# Patient Record
Sex: MALE | Race: WHITE | Employment: UNEMPLOYED | URBAN - METROPOLITAN AREA
[De-identification: names, ages, dates, MRNs, and addresses within clinical notes are randomized per-mention and may not be internally consistent; named-entity substitution may affect disease eponyms.]

---

## 2017-07-21 RX ORDER — HYDROCHLOROTHIAZIDE 12.5 MG/1
TABLET ORAL
Qty: 90 TABLET | Refills: 2 | OUTPATIENT
Start: 2017-07-21

## 2017-08-07 ENCOUNTER — OFFICE VISIT (OUTPATIENT)
Dept: FAMILY MEDICINE CLINIC | Age: 22
End: 2017-08-07
Payer: COMMERCIAL

## 2017-08-07 VITALS
BODY MASS INDEX: 40.23 KG/M2 | HEIGHT: 70 IN | WEIGHT: 281 LBS | OXYGEN SATURATION: 98 % | TEMPERATURE: 98.3 F | HEART RATE: 73 BPM | SYSTOLIC BLOOD PRESSURE: 120 MMHG | DIASTOLIC BLOOD PRESSURE: 70 MMHG

## 2017-08-07 DIAGNOSIS — L70.0 ACNE VULGARIS: ICD-10-CM

## 2017-08-07 DIAGNOSIS — I10 ESSENTIAL HYPERTENSION: Primary | ICD-10-CM

## 2017-08-07 PROCEDURE — 99214 OFFICE O/P EST MOD 30 MIN: CPT | Performed by: NURSE PRACTITIONER

## 2017-08-07 RX ORDER — HYDROCHLOROTHIAZIDE 12.5 MG/1
12.5 TABLET ORAL DAILY
Qty: 90 TABLET | Refills: 2 | Status: SHIPPED | OUTPATIENT
Start: 2017-08-07 | End: 2018-01-09 | Stop reason: SDUPTHER

## 2017-08-07 ASSESSMENT — ENCOUNTER SYMPTOMS
ABDOMINAL PAIN: 0
CONSTIPATION: 0
ALLERGIC/IMMUNOLOGIC NEGATIVE: 1
COUGH: 0
EYES NEGATIVE: 1
WHEEZING: 0
RESPIRATORY NEGATIVE: 1
DIARRHEA: 0
ROS SKIN COMMENTS: ACNE
SHORTNESS OF BREATH: 0
GASTROINTESTINAL NEGATIVE: 1

## 2017-08-07 ASSESSMENT — PATIENT HEALTH QUESTIONNAIRE - PHQ9
2. FEELING DOWN, DEPRESSED OR HOPELESS: 0
1. LITTLE INTEREST OR PLEASURE IN DOING THINGS: 0
SUM OF ALL RESPONSES TO PHQ QUESTIONS 1-9: 0
SUM OF ALL RESPONSES TO PHQ9 QUESTIONS 1 & 2: 0

## 2018-01-06 ENCOUNTER — HOSPITAL ENCOUNTER (EMERGENCY)
Age: 23
Discharge: HOME | End: 2018-01-07
Attending: EMERGENCY MEDICINE
Payer: COMMERCIAL

## 2018-01-06 VITALS
BODY MASS INDEX: 40.89 KG/M2 | WEIGHT: 285 LBS | RESPIRATION RATE: 16 BRPM | OXYGEN SATURATION: 98 % | DIASTOLIC BLOOD PRESSURE: 95 MMHG | HEART RATE: 98 BPM | SYSTOLIC BLOOD PRESSURE: 140 MMHG | TEMPERATURE: 97.9 F

## 2018-01-06 DIAGNOSIS — K62.5 ANAL BLEEDING: Primary | ICD-10-CM

## 2018-01-06 PROCEDURE — 99283 EMERGENCY DEPT VISIT LOW MDM: CPT

## 2018-01-06 RX ORDER — AMOXICILLIN AND CLAVULANATE POTASSIUM 600; 42.9 MG/5ML; MG/5ML
875 POWDER, FOR SUSPENSION ORAL ONCE
Status: COMPLETED | OUTPATIENT
Start: 2018-01-07 | End: 2018-01-07

## 2018-01-06 RX ORDER — AMOXICILLIN AND CLAVULANATE POTASSIUM 600; 42.9 MG/5ML; MG/5ML
875 POWDER, FOR SUSPENSION ORAL 2 TIMES DAILY
Qty: 102.2 ML | Refills: 0 | Status: SHIPPED | OUTPATIENT
Start: 2018-01-06 | End: 2018-01-09 | Stop reason: ALTCHOICE

## 2018-01-07 PROCEDURE — 6370000000 HC RX 637 (ALT 250 FOR IP): Performed by: EMERGENCY MEDICINE

## 2018-01-07 RX ADMIN — AMOXICILLIN AND CLAVULANATE POTASSIUM 875 MG: 600; 42.9 SUSPENSION ORAL at 00:10

## 2018-01-07 ASSESSMENT — ENCOUNTER SYMPTOMS
NAUSEA: 0
ABDOMINAL PAIN: 0
ANAL BLEEDING: 1
VOMITING: 0
DIARRHEA: 0
SHORTNESS OF BREATH: 0
CONSTIPATION: 0

## 2018-01-07 NOTE — ED PROVIDER NOTES
appointment to see pain a few days. We'll discharge home with Augmentin. Mom advised to keep area clean and dry. RADIOLOGY:  None    EKG  None    All EKG's are interpreted by the Emergency Department Physician who either signs or Co-signs this chart in the absence of a cardiologist.    EMERGENCY DEPARTMENT COURSE:      PROCEDURES:  None    CONSULTS:  None    CRITICAL CARE:  None    FINAL IMPRESSION      1.  Anal bleeding          DISPOSITION / PLAN     DISPOSITION Decision To Discharge 01/06/2018 11:51:46 PM      PATIENT REFERRED TO:  Jazmyn Gramajo NP  [address] [address]  [phone]    Go on 1/9/2018  For wound re-check    [facility] ED  [address] [place] [address]  [phone]  Go to   If symptoms worsen      DISCHARGE MEDICATIONS:  New Prescriptions    AMOXICILLIN-CLAVULANATE (AUGMENTIN ES-600) 600-42.9 MG/5ML SUSPENSION    Take 7.3 mLs by mouth 2 times daily for 7 days       Omero Mike MD  Emergency Medicine Resident    (Please note that portions of this note were completed with a voice recognition program.  Efforts were made to edit the dictations but occasionally words are mis-transcribed.)        Omero Mike MD  Resident  01/07/18 [id number]

## 2018-01-09 ENCOUNTER — OFFICE VISIT (OUTPATIENT)
Dept: FAMILY MEDICINE CLINIC | Age: 23
End: 2018-01-09
Payer: COMMERCIAL

## 2018-01-09 ENCOUNTER — HOSPITAL ENCOUNTER (OUTPATIENT)
Age: 23
Setting detail: SPECIMEN
Discharge: HOME | End: 2018-01-09
Payer: COMMERCIAL

## 2018-01-09 VITALS
HEART RATE: 77 BPM | OXYGEN SATURATION: 99 % | TEMPERATURE: 98 F | DIASTOLIC BLOOD PRESSURE: 82 MMHG | WEIGHT: 283 LBS | SYSTOLIC BLOOD PRESSURE: 120 MMHG | BODY MASS INDEX: 40.61 KG/M2

## 2018-01-09 DIAGNOSIS — I10 ESSENTIAL HYPERTENSION: ICD-10-CM

## 2018-01-09 DIAGNOSIS — T14.8XXA OPEN WOUND: Primary | ICD-10-CM

## 2018-01-09 DIAGNOSIS — H61.21 IMPACTED CERUMEN OF RIGHT EAR: ICD-10-CM

## 2018-01-09 PROCEDURE — G8417 CALC BMI ABV UP PARAM F/U: HCPCS | Performed by: NURSE PRACTITIONER

## 2018-01-09 PROCEDURE — 99214 OFFICE O/P EST MOD 30 MIN: CPT | Performed by: NURSE PRACTITIONER

## 2018-01-09 PROCEDURE — G8484 FLU IMMUNIZE NO ADMIN: HCPCS | Performed by: NURSE PRACTITIONER

## 2018-01-09 PROCEDURE — G8427 DOCREV CUR MEDS BY ELIG CLIN: HCPCS | Performed by: NURSE PRACTITIONER

## 2018-01-09 PROCEDURE — 1036F TOBACCO NON-USER: CPT | Performed by: NURSE PRACTITIONER

## 2018-01-09 RX ORDER — HYDROCHLOROTHIAZIDE 12.5 MG/1
12.5 TABLET ORAL DAILY
Qty: 90 TABLET | Refills: 2 | Status: SHIPPED | OUTPATIENT
Start: 2018-01-09 | End: 2019-01-21 | Stop reason: SDUPTHER

## 2018-01-09 RX ORDER — MULTIVITAMIN WITH IRON
50 TABLET ORAL DAILY
COMMUNITY
End: 2021-08-26

## 2018-01-09 ASSESSMENT — ENCOUNTER SYMPTOMS
ALLERGIC/IMMUNOLOGIC NEGATIVE: 1
COUGH: 0
ABDOMINAL PAIN: 0
CONSTIPATION: 0
DIARRHEA: 0
EYES NEGATIVE: 1
SHORTNESS OF BREATH: 0
RESPIRATORY NEGATIVE: 1
GASTROINTESTINAL NEGATIVE: 1
WHEEZING: 0
ROS SKIN COMMENTS: ACNE

## 2018-01-09 NOTE — PROGRESS NOTES
peroxide (AURO EARDROPS) 6.5 % otic solution Place 5 drops in ear(s) 2 times daily 1 Bottle 0    levETIRAcetam (KEPPRA XR) 500 MG TB24 extended release tablet 500 mg 5 times daily   3    OXTELLAR  MG TB24 Take 600 mg by mouth 4 times daily   3    Melatonin 5 MG TABS tablet Take 1.5 mg by mouth daily Takes 1/2 of 5mg tab= 2.5mg daily       Pyridoxine HCl (VITAMIN B-6) 100 MG tablet Take 100 mg by mouth daily. No current facility-administered medications for this visit. Allergies   Allergen Reactions    Azithromycin     Bee Venom        Health Maintenance   Topic Date Due    Flu vaccine (1) 10/27/2018 (Originally 9/1/2017)    Potassium monitoring  01/09/2019 (Originally 12/27/2017)    Creatinine monitoring  01/09/2019 (Originally 12/27/2017)    HIV screen  01/09/2019 (Originally 4/8/2010)    DTaP/Tdap/Td vaccine (2 - Td) 10/27/2026    Meningococcal (MCV) Vaccine Age 0-22 Years  Completed          Review of Systems   Reason unable to perform ROS: per mom. Constitutional: Negative. Negative for diaphoresis, fatigue and fever. HENT: Negative. Eyes: Negative. Respiratory: Negative. Negative for cough, shortness of breath and wheezing. Cardiovascular: Negative. Negative for chest pain and palpitations. Gastrointestinal: Negative. Negative for abdominal pain, constipation and diarrhea. Endocrine: Negative. Negative for cold intolerance and heat intolerance. Genitourinary: Negative. Negative for difficulty urinating and urgency. Musculoskeletal: Negative. Negative for arthralgias. Skin: Positive for wound (gluteal cleft). Negative for pallor and rash. Acne     Allergic/Immunologic: Negative. Neurological: Positive for seizures. Negative for headaches. Hematological: Negative. Psychiatric/Behavioral: Positive for decreased concentration and dysphoric mood. Negative for sleep disturbance. The patient is not nervous/anxious.         Objective:     BP (HYDRODIURIL) 12.5 MG tablet; Take 1 tablet by mouth daily  Dispense: 90 tablet; Refill: 2    3. Impacted cerumen of right ear    - carbamide peroxide (AURO EARDROPS) 6.5 % otic solution; Place 5 drops in ear(s) 2 times daily  Dispense: 1 Bottle; Refill: 0  - Ear wax removal      Orders Placed This Encounter   Procedures    Aerobic Culture   [address]     Referral Priority:   Routine     Referral Type:   Consult for Advice and Opinion     Referral Reason:   Specialty Services Required     Number of Visits Requested:   1    Ear wax removal       No Follow-up on file. Patient given educational materials - see patient instructions. Discussed use, benefit, and side effects of prescribed medications. All patient questions answered. Pt voiced understanding. Reviewed health maintenance. Instructed to continue current medications, diet and exercise. Patient agreed with treatment plan. Follow up as directed.      Electronically signed by Derrick Florez NP on 1/9/2018

## 2018-01-09 NOTE — PROGRESS NOTES
Patient is present for a 6 month follow up for HTN. Patient was at [facility] er on 1/6/18 for an abscess in buttox region. Patient was given oral amoxil. Patient refuses to take medication. Mom wants to know if you can send something different in and in pill form that she can crysh to give him. Patient has seen Dr. Letty Ruby 1 month ago. Medications and pharmacy reviewed with patient. Patient is having right ear pain also. Visit Information    Have you changed or started any medications since your last visit including any over-the-counter medicines, vitamins, or herbal medicines? no   Have you stopped taking any of your medications? Is so, why? -  no  Are you having any side effects from any of your medications? - no    Have you seen any other physician or provider since your last visit? yes - Dr. Letty Ruby   Have you had any other diagnostic tests since your last visit?  no   Have you been seen in the emergency room and/or had an admission in a hospital since we last saw you?  yes - [facility], abscess    Have you had your routine dental cleaning in the past 6 months?  no     Do you have an active MyChart account? If no, what is the barrier?   Yes    Patient Care Team:  Nilsa Javier NP as PCP - General (Certified Nurse Practitioner)  Ever Sears MD as Consulting Physician (Neurology)  Nilsa Javier NP    Medical History Review  Past Medical, Family, and Social History reviewed and does contribute to the patient presenting condition    Health Maintenance   Topic Date Due    HIV screen  04/08/2010    Potassium monitoring  12/27/2017    Creatinine monitoring  12/27/2017    Flu vaccine (1) 10/27/2018 (Originally 9/1/2017)    DTaP/Tdap/Td vaccine (2 - Td) 10/27/2026    Meningococcal (MCV) Vaccine Age 0-22 Years  Completed

## 2018-01-16 LAB
CULTURE: ABNORMAL
CULTURE: ABNORMAL
DIRECT EXAM: ABNORMAL
Lab: ABNORMAL
SPECIMEN DESCRIPTION: ABNORMAL
STATUS: ABNORMAL

## 2018-02-02 ENCOUNTER — HOSPITAL ENCOUNTER (OUTPATIENT)
Dept: WOUND CARE | Age: 23
Discharge: HOME | End: 2018-02-02
Payer: COMMERCIAL

## 2018-02-16 ENCOUNTER — HOSPITAL ENCOUNTER (OUTPATIENT)
Dept: WOUND CARE | Age: 23
Discharge: HOME | End: 2018-02-16
Payer: COMMERCIAL

## 2018-02-16 VITALS
BODY MASS INDEX: 40.8 KG/M2 | WEIGHT: 285 LBS | TEMPERATURE: 98.4 F | RESPIRATION RATE: 18 BRPM | SYSTOLIC BLOOD PRESSURE: 149 MMHG | HEART RATE: 94 BPM | DIASTOLIC BLOOD PRESSURE: 89 MMHG | HEIGHT: 70 IN

## 2018-02-16 PROCEDURE — 99212 OFFICE O/P EST SF 10 MIN: CPT

## 2018-02-16 RX ORDER — LIDOCAINE HYDROCHLORIDE 40 MG/ML
SOLUTION TOPICAL ONCE
Status: DISCONTINUED | OUTPATIENT
Start: 2018-02-16 | End: 2018-02-17 | Stop reason: HOSPADM

## 2018-03-01 ENCOUNTER — HOSPITAL ENCOUNTER (OUTPATIENT)
Dept: WOUND CARE | Age: 23
Discharge: HOME | End: 2018-03-01
Payer: COMMERCIAL

## 2018-03-01 RX ORDER — LIDOCAINE HYDROCHLORIDE 40 MG/ML
SOLUTION TOPICAL ONCE
Status: DISCONTINUED | OUTPATIENT
Start: 2018-03-01 | End: 2018-03-04 | Stop reason: HOSPADM

## 2019-01-21 DIAGNOSIS — I10 ESSENTIAL HYPERTENSION: ICD-10-CM

## 2019-01-22 RX ORDER — HYDROCHLOROTHIAZIDE 12.5 MG/1
12.5 TABLET ORAL DAILY
Qty: 30 TABLET | Refills: 0 | Status: SHIPPED | OUTPATIENT
Start: 2019-01-22 | End: 2019-02-21 | Stop reason: SDUPTHER

## 2019-02-21 ENCOUNTER — OFFICE VISIT (OUTPATIENT)
Dept: FAMILY MEDICINE CLINIC | Age: 24
End: 2019-02-21
Payer: COMMERCIAL

## 2019-02-21 ENCOUNTER — HOSPITAL ENCOUNTER (OUTPATIENT)
Age: 24
Setting detail: SPECIMEN
Discharge: HOME | End: 2019-02-21
Payer: COMMERCIAL

## 2019-02-21 VITALS
DIASTOLIC BLOOD PRESSURE: 80 MMHG | HEIGHT: 70 IN | OXYGEN SATURATION: 97 % | BODY MASS INDEX: 39.65 KG/M2 | HEART RATE: 68 BPM | TEMPERATURE: 98.4 F | SYSTOLIC BLOOD PRESSURE: 122 MMHG | WEIGHT: 277 LBS

## 2019-02-21 DIAGNOSIS — Z13.220 SCREENING FOR HYPERLIPIDEMIA: ICD-10-CM

## 2019-02-21 DIAGNOSIS — L70.0 CYSTIC ACNE: ICD-10-CM

## 2019-02-21 DIAGNOSIS — I10 ESSENTIAL HYPERTENSION: Primary | ICD-10-CM

## 2019-02-21 DIAGNOSIS — I10 ESSENTIAL HYPERTENSION: ICD-10-CM

## 2019-02-21 LAB
ABSOLUTE EOS #: 0.21 K/UL (ref 0–0.44)
ABSOLUTE IMMATURE GRANULOCYTE: <0.03 K/UL (ref 0–0.3)
ABSOLUTE LYMPH #: 2.23 K/UL (ref 1.1–3.7)
ABSOLUTE MONO #: 0.8 K/UL (ref 0.1–1.2)
ALBUMIN SERPL-MCNC: 4.5 G/DL (ref 3.5–5.2)
ALBUMIN SERPL-MCNC: 4.5 G/DL (ref 3.5–5.2)
ALBUMIN/GLOBULIN RATIO: 1.6 (ref 1–2.5)
ALBUMIN/GLOBULIN RATIO: 1.6 (ref 1–2.5)
ALP BLD-CCNC: 80 U/L (ref 40–129)
ALP BLD-CCNC: 80 U/L (ref 40–129)
ALT SERPL-CCNC: 33 U/L (ref 5–41)
ALT SERPL-CCNC: 33 U/L (ref 5–41)
ANION GAP SERPL CALCULATED.3IONS-SCNC: 12 MMOL/L (ref 9–17)
ANION GAP SERPL CALCULATED.3IONS-SCNC: 12 MMOL/L (ref 9–17)
AST SERPL-CCNC: 28 U/L
AST SERPL-CCNC: 28 U/L
BASOPHILS # BLD: 1 % (ref 0–2)
BASOPHILS ABSOLUTE: 0.04 K/UL (ref 0–0.2)
BILIRUB SERPL-MCNC: <0.1 MG/DL (ref 0.3–1.2)
BILIRUB SERPL-MCNC: <0.1 MG/DL (ref 0.3–1.2)
BUN BLDV-MCNC: 20 MG/DL (ref 6–20)
BUN BLDV-MCNC: 20 MG/DL (ref 6–20)
BUN/CREAT BLD: ABNORMAL (ref 9–20)
BUN/CREAT BLD: ABNORMAL (ref 9–20)
CALCIUM SERPL-MCNC: 9.6 MG/DL (ref 8.6–10.4)
CALCIUM SERPL-MCNC: 9.6 MG/DL (ref 8.6–10.4)
CHLORIDE BLD-SCNC: 101 MMOL/L (ref 98–107)
CHLORIDE BLD-SCNC: 101 MMOL/L (ref 98–107)
CHOLESTEROL/HDL RATIO: 5.5
CHOLESTEROL: 202 MG/DL
CO2: 27 MMOL/L (ref 20–31)
CO2: 27 MMOL/L (ref 20–31)
CREAT SERPL-MCNC: 0.87 MG/DL (ref 0.7–1.2)
CREAT SERPL-MCNC: 0.87 MG/DL (ref 0.7–1.2)
DIFFERENTIAL TYPE: NORMAL
EOSINOPHILS RELATIVE PERCENT: 3 % (ref 1–4)
GFR AFRICAN AMERICAN: >60 ML/MIN
GFR AFRICAN AMERICAN: >60 ML/MIN
GFR NON-AFRICAN AMERICAN: >60 ML/MIN
GFR NON-AFRICAN AMERICAN: >60 ML/MIN
GFR SERPL CREATININE-BSD FRML MDRD: ABNORMAL ML/MIN/{1.73_M2}
GLUCOSE BLD-MCNC: 94 MG/DL (ref 70–99)
GLUCOSE BLD-MCNC: 94 MG/DL (ref 70–99)
HCT VFR BLD CALC: 46.4 % (ref 40.7–50.3)
HCT VFR BLD CALC: 46.4 % (ref 40.7–50.3)
HDLC SERPL-MCNC: 37 MG/DL
HEMOGLOBIN: 15.5 G/DL (ref 13–17)
HEMOGLOBIN: 15.5 G/DL (ref 13–17)
IMMATURE GRANULOCYTES: 0 %
KEPPRA: 19 UG/ML
LDL CHOLESTEROL: 146 MG/DL (ref 0–130)
LYMPHOCYTES # BLD: 31 % (ref 24–43)
MCH RBC QN AUTO: 29.6 PG (ref 25.2–33.5)
MCH RBC QN AUTO: 29.6 PG (ref 25.2–33.5)
MCHC RBC AUTO-ENTMCNC: 33.4 G/DL (ref 28.4–34.8)
MCHC RBC AUTO-ENTMCNC: 33.4 G/DL (ref 28.4–34.8)
MCV RBC AUTO: 88.5 FL (ref 82.6–102.9)
MCV RBC AUTO: 88.5 FL (ref 82.6–102.9)
MONOCYTES # BLD: 11 % (ref 3–12)
NRBC AUTOMATED: 0 PER 100 WBC
NRBC AUTOMATED: 0 PER 100 WBC
PDW BLD-RTO: 13.1 % (ref 11.8–14.4)
PDW BLD-RTO: 13.1 % (ref 11.8–14.4)
PLATELET # BLD: 244 K/UL (ref 138–453)
PLATELET # BLD: 244 K/UL (ref 138–453)
PLATELET ESTIMATE: NORMAL
PMV BLD AUTO: 11.8 FL (ref 8.1–13.5)
PMV BLD AUTO: 11.8 FL (ref 8.1–13.5)
POTASSIUM SERPL-SCNC: 3.9 MMOL/L (ref 3.7–5.3)
POTASSIUM SERPL-SCNC: 3.9 MMOL/L (ref 3.7–5.3)
RBC # BLD: 5.24 M/UL (ref 4.21–5.77)
RBC # BLD: 5.24 M/UL (ref 4.21–5.77)
RBC # BLD: NORMAL 10*6/UL
SEG NEUTROPHILS: 54 % (ref 36–65)
SEGMENTED NEUTROPHILS ABSOLUTE COUNT: 3.92 K/UL (ref 1.5–8.1)
SODIUM BLD-SCNC: 140 MMOL/L (ref 135–144)
SODIUM BLD-SCNC: 140 MMOL/L (ref 135–144)
TOTAL PROTEIN: 7.4 G/DL (ref 6.4–8.3)
TOTAL PROTEIN: 7.4 G/DL (ref 6.4–8.3)
TRIGL SERPL-MCNC: 97 MG/DL
VLDLC SERPL CALC-MCNC: ABNORMAL MG/DL (ref 1–30)
WBC # BLD: 7.2 K/UL (ref 3.5–11.3)
WBC # BLD: 7.2 K/UL (ref 3.5–11.3)
WBC # BLD: NORMAL 10*3/UL

## 2019-02-21 PROCEDURE — 1036F TOBACCO NON-USER: CPT | Performed by: NURSE PRACTITIONER

## 2019-02-21 PROCEDURE — 99213 OFFICE O/P EST LOW 20 MIN: CPT | Performed by: NURSE PRACTITIONER

## 2019-02-21 PROCEDURE — G8417 CALC BMI ABV UP PARAM F/U: HCPCS | Performed by: NURSE PRACTITIONER

## 2019-02-21 PROCEDURE — G8427 DOCREV CUR MEDS BY ELIG CLIN: HCPCS | Performed by: NURSE PRACTITIONER

## 2019-02-21 PROCEDURE — G8484 FLU IMMUNIZE NO ADMIN: HCPCS | Performed by: NURSE PRACTITIONER

## 2019-02-21 RX ORDER — DOXYCYCLINE HYCLATE 100 MG/1
100 CAPSULE ORAL 2 TIMES DAILY
Qty: 20 CAPSULE | Refills: 0 | Status: SHIPPED | OUTPATIENT
Start: 2019-02-21 | End: 2019-04-17 | Stop reason: SDUPTHER

## 2019-02-21 RX ORDER — ADAPALENE 0.1 G/100G
CREAM TOPICAL
Qty: 45 G | Refills: 11 | Status: SHIPPED | OUTPATIENT
Start: 2019-02-21 | End: 2020-01-16 | Stop reason: ALTCHOICE

## 2019-02-21 RX ORDER — HYDROCHLOROTHIAZIDE 12.5 MG/1
12.5 TABLET ORAL DAILY
Qty: 90 TABLET | Refills: 1 | Status: SHIPPED | OUTPATIENT
Start: 2019-02-21 | End: 2019-08-22 | Stop reason: SDUPTHER

## 2019-02-21 ASSESSMENT — PATIENT HEALTH QUESTIONNAIRE - PHQ9
1. LITTLE INTEREST OR PLEASURE IN DOING THINGS: 0
2. FEELING DOWN, DEPRESSED OR HOPELESS: 0
SUM OF ALL RESPONSES TO PHQ9 QUESTIONS 1 & 2: 0
SUM OF ALL RESPONSES TO PHQ QUESTIONS 1-9: 0
SUM OF ALL RESPONSES TO PHQ QUESTIONS 1-9: 0

## 2019-02-21 ASSESSMENT — ENCOUNTER SYMPTOMS
ABDOMINAL PAIN: 0
RESPIRATORY NEGATIVE: 1
CONSTIPATION: 0
GASTROINTESTINAL NEGATIVE: 1
ALLERGIC/IMMUNOLOGIC NEGATIVE: 1
ROS SKIN COMMENTS: ACNE
COUGH: 0
WHEEZING: 0
SHORTNESS OF BREATH: 0
EYES NEGATIVE: 1

## 2019-02-22 DIAGNOSIS — E78.2 MIXED HYPERLIPIDEMIA: Primary | ICD-10-CM

## 2019-02-24 DIAGNOSIS — E78.2 MIXED HYPERLIPIDEMIA: Primary | ICD-10-CM

## 2019-02-24 LAB — OXCARBAZEPINE: 43 UG/ML (ref 3–35)

## 2019-02-24 RX ORDER — ATORVASTATIN CALCIUM 20 MG/1
20 TABLET, FILM COATED ORAL DAILY
Qty: 30 TABLET | Refills: 11 | Status: SHIPPED | OUTPATIENT
Start: 2019-02-24 | End: 2020-01-16 | Stop reason: SDUPTHER

## 2019-03-25 DIAGNOSIS — L70.0 CYSTIC ACNE: ICD-10-CM

## 2019-03-25 RX ORDER — DOXYCYCLINE HYCLATE 100 MG/1
CAPSULE ORAL
Qty: 20 CAPSULE | Refills: 10 | OUTPATIENT
Start: 2019-03-25

## 2019-04-17 DIAGNOSIS — L70.0 CYSTIC ACNE: ICD-10-CM

## 2019-04-17 RX ORDER — DOXYCYCLINE HYCLATE 100 MG/1
CAPSULE ORAL
Qty: 20 CAPSULE | Refills: 10 | Status: SHIPPED | OUTPATIENT
Start: 2019-04-17 | End: 2021-01-27 | Stop reason: ALTCHOICE

## 2019-08-22 DIAGNOSIS — L70.0 CYSTIC ACNE: ICD-10-CM

## 2019-08-22 DIAGNOSIS — I10 ESSENTIAL HYPERTENSION: ICD-10-CM

## 2019-08-22 RX ORDER — HYDROCHLOROTHIAZIDE 12.5 MG/1
TABLET ORAL
Qty: 90 TABLET | Refills: 1 | Status: SHIPPED | OUTPATIENT
Start: 2019-08-22 | End: 2020-01-16 | Stop reason: SDUPTHER

## 2019-08-22 RX ORDER — ADAPALENE 0.1 G/100G
CREAM TOPICAL
Qty: 45 G | Refills: 11 | OUTPATIENT
Start: 2019-08-22

## 2019-10-18 DIAGNOSIS — L70.0 CYSTIC ACNE: ICD-10-CM

## 2019-11-14 DIAGNOSIS — L70.0 CYSTIC ACNE: ICD-10-CM

## 2020-01-16 ENCOUNTER — OFFICE VISIT (OUTPATIENT)
Dept: FAMILY MEDICINE CLINIC | Age: 25
End: 2020-01-16
Payer: COMMERCIAL

## 2020-01-16 ENCOUNTER — HOSPITAL ENCOUNTER (OUTPATIENT)
Age: 25
Setting detail: SPECIMEN
Discharge: HOME | End: 2020-01-16
Payer: COMMERCIAL

## 2020-01-16 VITALS
OXYGEN SATURATION: 98 % | BODY MASS INDEX: 39.8 KG/M2 | HEART RATE: 75 BPM | HEIGHT: 70 IN | DIASTOLIC BLOOD PRESSURE: 80 MMHG | WEIGHT: 278 LBS | SYSTOLIC BLOOD PRESSURE: 120 MMHG

## 2020-01-16 PROBLEM — R45.1 AGITATION: Status: ACTIVE | Noted: 2019-11-15

## 2020-01-16 PROBLEM — G40.309 EPILEPSY, GENERALIZED, CONVULSIVE (HCC): Status: ACTIVE | Noted: 2019-07-12

## 2020-01-16 LAB
ABSOLUTE EOS #: 0.25 K/UL (ref 0–0.44)
ABSOLUTE IMMATURE GRANULOCYTE: <0.03 K/UL (ref 0–0.3)
ABSOLUTE LYMPH #: 1.91 K/UL (ref 1.1–3.7)
ABSOLUTE MONO #: 0.6 K/UL (ref 0.1–1.2)
ALBUMIN SERPL-MCNC: 4.2 G/DL (ref 3.5–5.2)
ALBUMIN/GLOBULIN RATIO: 1.4 (ref 1–2.5)
ALP BLD-CCNC: 80 U/L (ref 40–129)
ALT SERPL-CCNC: 36 U/L (ref 5–41)
ANION GAP SERPL CALCULATED.3IONS-SCNC: 17 MMOL/L (ref 9–17)
AST SERPL-CCNC: 26 U/L
BASOPHILS # BLD: 0 % (ref 0–2)
BASOPHILS ABSOLUTE: 0.03 K/UL (ref 0–0.2)
BILIRUB SERPL-MCNC: 0.17 MG/DL (ref 0.3–1.2)
BUN BLDV-MCNC: 12 MG/DL (ref 6–20)
BUN/CREAT BLD: ABNORMAL (ref 9–20)
CALCIUM SERPL-MCNC: 8.9 MG/DL (ref 8.6–10.4)
CHLORIDE BLD-SCNC: 104 MMOL/L (ref 98–107)
CHOLESTEROL/HDL RATIO: 3.6
CHOLESTEROL: 137 MG/DL
CO2: 23 MMOL/L (ref 20–31)
CREAT SERPL-MCNC: 0.94 MG/DL (ref 0.7–1.2)
DIFFERENTIAL TYPE: NORMAL
EOSINOPHILS RELATIVE PERCENT: 3 % (ref 1–4)
GFR AFRICAN AMERICAN: >60 ML/MIN
GFR NON-AFRICAN AMERICAN: >60 ML/MIN
GFR SERPL CREATININE-BSD FRML MDRD: ABNORMAL ML/MIN/{1.73_M2}
GFR SERPL CREATININE-BSD FRML MDRD: ABNORMAL ML/MIN/{1.73_M2}
GLUCOSE BLD-MCNC: 103 MG/DL (ref 70–99)
HCT VFR BLD CALC: 44.6 % (ref 40.7–50.3)
HDLC SERPL-MCNC: 38 MG/DL
HEMOGLOBIN: 14.7 G/DL (ref 13–17)
IMMATURE GRANULOCYTES: 0 %
LDL CHOLESTEROL: 64 MG/DL (ref 0–130)
LYMPHOCYTES # BLD: 25 % (ref 24–43)
MCH RBC QN AUTO: 29.5 PG (ref 25.2–33.5)
MCHC RBC AUTO-ENTMCNC: 33 G/DL (ref 28.4–34.8)
MCV RBC AUTO: 89.6 FL (ref 82.6–102.9)
MONOCYTES # BLD: 8 % (ref 3–12)
NRBC AUTOMATED: 0 PER 100 WBC
PDW BLD-RTO: 13 % (ref 11.8–14.4)
PLATELET # BLD: 258 K/UL (ref 138–453)
PLATELET ESTIMATE: NORMAL
PMV BLD AUTO: 11.8 FL (ref 8.1–13.5)
POTASSIUM SERPL-SCNC: 3.6 MMOL/L (ref 3.7–5.3)
RBC # BLD: 4.98 M/UL (ref 4.21–5.77)
RBC # BLD: NORMAL 10*6/UL
SEG NEUTROPHILS: 64 % (ref 36–65)
SEGMENTED NEUTROPHILS ABSOLUTE COUNT: 4.8 K/UL (ref 1.5–8.1)
SODIUM BLD-SCNC: 144 MMOL/L (ref 135–144)
TOTAL PROTEIN: 7.1 G/DL (ref 6.4–8.3)
TRIGL SERPL-MCNC: 177 MG/DL
VLDLC SERPL CALC-MCNC: ABNORMAL MG/DL (ref 1–30)
WBC # BLD: 7.6 K/UL (ref 3.5–11.3)
WBC # BLD: NORMAL 10*3/UL

## 2020-01-16 PROCEDURE — G8484 FLU IMMUNIZE NO ADMIN: HCPCS | Performed by: NURSE PRACTITIONER

## 2020-01-16 PROCEDURE — 1036F TOBACCO NON-USER: CPT | Performed by: NURSE PRACTITIONER

## 2020-01-16 PROCEDURE — 99213 OFFICE O/P EST LOW 20 MIN: CPT | Performed by: NURSE PRACTITIONER

## 2020-01-16 PROCEDURE — G8417 CALC BMI ABV UP PARAM F/U: HCPCS | Performed by: NURSE PRACTITIONER

## 2020-01-16 PROCEDURE — G8427 DOCREV CUR MEDS BY ELIG CLIN: HCPCS | Performed by: NURSE PRACTITIONER

## 2020-01-16 RX ORDER — RISPERIDONE 1 MG/1
TABLET, FILM COATED ORAL
Qty: 60 TABLET | Status: CANCELLED | OUTPATIENT
Start: 2020-01-16

## 2020-01-16 RX ORDER — HYDROCHLOROTHIAZIDE 12.5 MG/1
TABLET ORAL
Qty: 90 TABLET | Refills: 1 | Status: SHIPPED | OUTPATIENT
Start: 2020-01-16 | End: 2020-04-14

## 2020-01-16 RX ORDER — RISPERIDONE 1 MG/1
TABLET, FILM COATED ORAL
COMMUNITY
Start: 2019-11-15

## 2020-01-16 RX ORDER — HYDROCHLOROTHIAZIDE 12.5 MG/1
TABLET ORAL
Qty: 90 TABLET | Refills: 1 | Status: SHIPPED | OUTPATIENT
Start: 2020-01-16 | End: 2020-01-16 | Stop reason: ALTCHOICE

## 2020-01-16 RX ORDER — ATORVASTATIN CALCIUM 20 MG/1
20 TABLET, FILM COATED ORAL DAILY
Qty: 90 TABLET | Refills: 1 | Status: SHIPPED | OUTPATIENT
Start: 2020-01-16 | End: 2020-10-05

## 2020-01-16 ASSESSMENT — ENCOUNTER SYMPTOMS
ALLERGIC/IMMUNOLOGIC NEGATIVE: 1
GASTROINTESTINAL NEGATIVE: 1
RESPIRATORY NEGATIVE: 1
EYES NEGATIVE: 1
COUGH: 0

## 2020-01-16 ASSESSMENT — PATIENT HEALTH QUESTIONNAIRE - PHQ9: DEPRESSION UNABLE TO ASSESS: PT REFUSES

## 2020-01-16 NOTE — PROGRESS NOTES
[facility]  [facility]YS [facility] [address]  [facility] [place] [address]  Dept: [phone]  Dept Fax: [phone]      Roe Moore is a 25 y.o. male who presents today for hismedical conditions/complaints as noted below. Roe Moore is c/o of 6 Month Follow-Up and Hypertension            HPI:      KAPIL Delgadillo is here today for BP check. BP is slightly elevated today. Denies chest pain or headaches. sz disorder-seeing Dr. Ervin Camilo. No seizures. Getting labs drawn today.        Hemoglobin A1C (%)   Date Value   08/22/2015 5.2             ( goal A1Cis < 7)   No results found for: LABMICR  LDL Cholesterol (mg/dL)   Date Value   02/21/2019 146 (H)       (goal LDL is <100)   AST (U/L)   Date Value   02/21/2019 28   02/21/2019 28     ALT (U/L)   Date Value   02/21/2019 33   02/21/2019 33     BUN (mg/dL)   Date Value   02/21/2019 20   02/21/2019 20     BP Readings from Last 3 Encounters:   01/16/20 (!) 138/90   02/21/19 122/80   02/16/18 (!) 149/89          (goal 120/80)    Past Medical History:   Diagnosis Date    Autism     Mental developmental delay 8/9/2012    Seizure disorder ([facility] [address] 8/9/2012    Seizures ([facility] [address]       Past Surgical History:   Procedure Laterality Date    TOE SURGERY         Family History   Problem Relation Age of Onset    High Blood Pressure Mother     Diabetes Mother     High Cholesterol Mother     High Blood Pressure Father     Heart Disease Maternal Grandmother     Heart Disease Maternal Grandfather           Social History     Tobacco Use    Smoking status: Never Smoker    Smokeless tobacco: Never Used   Substance Use Topics    Alcohol use: No         Current Outpatient Medications   Medication Sig Dispense Refill    risperiDONE (RISPERDAL) 1 MG tablet Take 1 tablet by mouth nightly      atorvastatin (LIPITOR) 20 MG tablet Take 1 tablet by mouth daily 90 tablet 1    hydrochlorothiazide (HYDRODIURIL) 12.5 MG tablet TAKE 1 TABLET BY MOUTH ONCE difficulty urinating and urgency. Musculoskeletal: Negative. Negative for arthralgias. Skin: Negative for wound. Allergic/Immunologic: Negative. Neurological: Positive for seizures. Negative for headaches. Hematological: Negative. Psychiatric/Behavioral: Positive for decreased concentration and dysphoric mood. Negative for sleep disturbance. The patient is not nervous/anxious. Objective:     BP (!) 138/90 (Site: Right Upper Arm, Position: Sitting, Cuff Size: Medium Adult)   Pulse 75   Ht 5' 10\" (1.778 m)   Wt 278 lb (126.1 kg)   SpO2 98%   BMI 39.89 kg/m²   Physical Exam      Assessment:      1. Mixed hyperlipidemia    2. Essential hypertension                     Plan:      No orders of the defined types were placed in this encounter. 1. Essential hypertension    - hydrochlorothiazide (HYDRODIURIL) 12.5 MG tablet; TAKE 1 TABLET BY MOUTH ONCE DAILY  Dispense: 90 tablet; Refill: 1    2. Mixed hyperlipidemia    - atorvastatin (LIPITOR) 20 MG tablet; Take 1 tablet by mouth daily  Dispense: 90 tablet; Refill: 1        No follow-ups on file. Patient given educational materials - see patientinstructions. Discussed use, benefit, and side effects of prescribed medications. All patient questions answered. Pt voiced understanding. Reviewed health maintenance. Instructed to continue current medications, diet and exercise. Patient agreedwith treatment plan. Follow up as directed.      Electronically signed by ROBERTO Bansal CNP on 1/16/2020

## 2020-01-20 LAB — OXCARBAZEPINE: 37 UG/ML (ref 3–35)

## 2020-04-14 RX ORDER — HYDROCHLOROTHIAZIDE 12.5 MG/1
TABLET ORAL
Qty: 90 TABLET | Refills: 10 | OUTPATIENT
Start: 2020-04-14

## 2020-04-14 RX ORDER — HYDROCHLOROTHIAZIDE 12.5 MG/1
TABLET ORAL
Qty: 90 TABLET | Refills: 1 | Status: SHIPPED | OUTPATIENT
Start: 2020-04-14 | End: 2020-10-06

## 2020-07-25 ENCOUNTER — PATIENT MESSAGE (OUTPATIENT)
Dept: FAMILY MEDICINE CLINIC | Age: 25
End: 2020-07-25

## 2020-07-27 RX ORDER — CLINDAMYCIN HYDROCHLORIDE 300 MG/1
300 CAPSULE ORAL 3 TIMES DAILY
Qty: 21 CAPSULE | Refills: 0 | Status: SHIPPED | OUTPATIENT
Start: 2020-07-27 | End: 2020-08-26 | Stop reason: SDUPTHER

## 2020-07-27 NOTE — TELEPHONE ENCOUNTER
From: Leopold Costa  To: ROBERTO Crowell - CNP  Sent: 7/25/2020 6:58 AM EDT  Subject: Prescription Question    This message is being sent by Дмитрий Goldberg on behalf of Leopold Costa. Edson Lau this is Jatinder Jeans Scott's mom I'm having a big problem he has a tooth that is broke he won't let the dentist pull it they said that he would have to be put to sleep in order to pull it. He's been on antibiotics once before it is very swollen and he's not eating and I don't know what to do this is serious and may not sound serious but it is it is very swollen there's definitely infection in there.  Please help me with nothing else in definitely needs antibiotics

## 2020-08-05 ENCOUNTER — OFFICE VISIT (OUTPATIENT)
Dept: FAMILY MEDICINE CLINIC | Age: 25
End: 2020-08-05
Payer: COMMERCIAL

## 2020-08-05 VITALS
DIASTOLIC BLOOD PRESSURE: 80 MMHG | BODY MASS INDEX: 38.28 KG/M2 | HEART RATE: 64 BPM | WEIGHT: 266.8 LBS | SYSTOLIC BLOOD PRESSURE: 118 MMHG | OXYGEN SATURATION: 98 % | TEMPERATURE: 97 F

## 2020-08-05 PROBLEM — E66.01 MORBIDLY OBESE (HCC): Status: ACTIVE | Noted: 2020-08-05

## 2020-08-05 PROCEDURE — 99213 OFFICE O/P EST LOW 20 MIN: CPT | Performed by: NURSE PRACTITIONER

## 2020-08-05 PROCEDURE — G8417 CALC BMI ABV UP PARAM F/U: HCPCS | Performed by: NURSE PRACTITIONER

## 2020-08-05 PROCEDURE — G8427 DOCREV CUR MEDS BY ELIG CLIN: HCPCS | Performed by: NURSE PRACTITIONER

## 2020-08-05 PROCEDURE — 1036F TOBACCO NON-USER: CPT | Performed by: NURSE PRACTITIONER

## 2020-08-05 RX ORDER — CLINDAMYCIN PALMITATE HYDROCHLORIDE 75 MG/5ML
SOLUTION ORAL
COMMUNITY
Start: 2020-08-02 | End: 2021-01-27 | Stop reason: ALTCHOICE

## 2020-08-05 ASSESSMENT — ENCOUNTER SYMPTOMS
COUGH: 0
GASTROINTESTINAL NEGATIVE: 1
RESPIRATORY NEGATIVE: 1
EYES NEGATIVE: 1
ALLERGIC/IMMUNOLOGIC NEGATIVE: 1

## 2020-08-05 ASSESSMENT — PATIENT HEALTH QUESTIONNAIRE - PHQ9
SUM OF ALL RESPONSES TO PHQ9 QUESTIONS 1 & 2: 0
2. FEELING DOWN, DEPRESSED OR HOPELESS: 0
SUM OF ALL RESPONSES TO PHQ QUESTIONS 1-9: 0
1. LITTLE INTEREST OR PLEASURE IN DOING THINGS: 0
SUM OF ALL RESPONSES TO PHQ QUESTIONS 1-9: 0

## 2020-08-05 NOTE — PROGRESS NOTES
Chronic Disease Visit Information    BP Readings from Last 3 Encounters:   01/16/20 120/80   02/21/19 122/80   02/16/18 (!) 149/89          Hemoglobin A1C (%)   Date Value   08/22/2015 5.2     LDL Cholesterol (mg/dL)   Date Value   01/16/2020 64     HDL (mg/dL)   Date Value   01/16/2020 38 (L)     BUN (mg/dL)   Date Value   01/16/2020 12     CREATININE (mg/dL)   Date Value   01/16/2020 0.94     Glucose (mg/dL)   Date Value   01/16/2020 103 (H)            Have you changed or started any medications since your last visit including any over-the-counter medicines, vitamins, or herbal medicines? no   Are you having any side effects from any of your medications? -  no  Have you stopped taking any of your medications? Is so, why? -  no    Have you seen any other physician or provider since your last visit? Yes - Records Obtained  Have you had any other diagnostic tests since your last visit? Yes - Records Obtained  Have you been seen in the emergency room and/or had an admission to a hospital since we last saw you? Yes - Records Obtained  Have you had your annual diabetic retinal (eye) exam? No  Have you had your routine dental cleaning in the past 6 months? no    Have you activated your [employer] account? If not, what are your barriers?  Yes     Patient Care Team:  ROBERTO Rhoades CNP as PCP - General (Certified Nurse Practitioner)  ROBERTO Rhoades CNP as PCP [facility]  Esther Menendez MD as Consulting Physician (Neurology)  ROBERTO Rhoades CNP, MD as Surgeon (General Surgery)         Medical History Review  Past Medical, Family, and Social History reviewed and does contribute to the patient presenting condition    Health Maintenance   Topic Date Due    Varicella vaccine (1 of 2 - 2-dose childhood series) 04/08/1996    HPV vaccine (1 - Male 2-dose series) 04/08/2006    Flu vaccine (1) 09/01/2020    Lipid screen  01/16/2021    Potassium monitoring  01/16/2021    Creatinine monitoring  01/16/2021    DTaP/Tdap/Td vaccine (2 - Td) 10/27/2026    Hepatitis A vaccine  Aged Out    Hepatitis B vaccine  Aged Out    Hib vaccine  Aged Out    Meningococcal (ACWY) vaccine  Aged Out    Pneumococcal 0-64 years Vaccine  Aged Out    HIV screen  Discontinued

## 2020-08-05 NOTE — PROGRESS NOTES
[address]  [facility]  [address] [place] [address]  Dept: [phone]  Dept Fax: [phone]      Jacquelin Reyna is a 22 y.o. male who presents today for hismedical conditions/complaints as noted below. Jacquelin Reyna is c/o of Hypertension            HPI:      HPI  HTN- BP is well controlled today. Compliant with taking medications. Denies chest pain, dyspnea, edema, or HA. Abscessed tooth-he is taking clindamycin currently for this. He has gone to the dentist but he would not cooperate. Mom is planning on taking him to an oral surgeon where he can be done under anesthesia. He has had wt loss due to the tooth. Mom is providing soft foods for him.      Hemoglobin A1C (%)   Date Value   08/22/2015 5.2             ( goal A1Cis < 7)   No results found for: LABMICR  LDL Cholesterol (mg/dL)   Date Value   01/16/2020 64   02/21/2019 146 (H)       (goal LDL is <100)   AST (U/L)   Date Value   01/16/2020 26     ALT (U/L)   Date Value   01/16/2020 36     BUN (mg/dL)   Date Value   01/16/2020 12     BP Readings from Last 3 Encounters:   08/05/20 118/80   01/16/20 120/80   02/21/19 122/80          (goal 120/80)    Past Medical History:   Diagnosis Date    Autism     Mental developmental delay 8/9/2012    Seizure disorder ([facility] [address] 8/9/2012    Seizures ([facility] [address]       Past Surgical History:   Procedure Laterality Date    TOE SURGERY         Family History   Problem Relation Age of Onset    High Blood Pressure Mother     Diabetes Mother     High Cholesterol Mother     High Blood Pressure Father     Heart Disease Maternal Grandmother     Heart Disease Maternal Grandfather           Social History     Tobacco Use    Smoking status: Never Smoker    Smokeless tobacco: Never Used   Substance Use Topics    Alcohol use: No         Current Outpatient Medications   Medication Sig Dispense Refill    clindamycin (CLEOCIN) 75 MG/5ML solution TAKE 20 MLS BY MOUTH THREE TIMES A DAY FOR 7 DAYS   DISCARD REMAINDER      hydrochlorothiazide (HYDRODIURIL) 12.5 MG tablet TAKE 1 TABLET BY MOUTH ONCE DAILY 90 tablet 1    risperiDONE (RISPERDAL) 1 MG tablet Take 1 tablet by mouth nightly      atorvastatin (LIPITOR) 20 MG tablet Take 1 tablet by mouth daily 90 tablet 1    Benzoyl Peroxide (BENZOYL PEROXIDE) 10 % external wash APPLY TOPICALLY TWICE DAILY 227 g 0    levETIRAcetam (KEPPRA XR) 500 MG TB24 extended release tablet 500 mg 5 times daily   3    OXTELLAR  MG TB24 Take 600 mg by mouth 4 times daily   3    Melatonin 5 MG TABS tablet Take 1.5 mg by mouth daily Takes 1/2 of 5mg tab= 2.5mg daily       Pyridoxine HCl (VITAMIN B-6) 100 MG tablet Take 100 mg by mouth daily.  doxycycline hyclate (VIBRAMYCIN) 100 MG capsule TAKE 1 CAPSULE BY MOUTH TWICE DAILY WITH FOOD FOR 10 DAYS *AVOID DAIRY CALCIUM AND [employer]'S 2 HOURS BEFORE AND AFTER DOSE* (Patient not taking: Reported on 1/16/2020) 20 capsule 10    vitamin B-6 (PYRIDOXINE) 100 MG tablet Take 50 mg by mouth daily       No current facility-administered medications for this visit. Allergies   Allergen Reactions    Azithromycin     Bee Venom        Health Maintenance   Topic Date Due    Varicella vaccine (1 of 2 - 2-dose childhood series) 04/08/1996    HPV vaccine (1 - Male 2-dose series) 04/08/2006    Flu vaccine (1) 09/01/2020    Lipid screen  01/16/2021    Potassium monitoring  01/16/2021    Creatinine monitoring  01/16/2021    DTaP/Tdap/Td vaccine (2 - Td) 10/27/2026    Hepatitis A vaccine  Aged Out    Hepatitis B vaccine  Aged Out    Hib vaccine  Aged Out    Meningococcal (ACWY) vaccine  Aged Out    Pneumococcal 0-64 years Vaccine  Aged Out    HIV screen  Discontinued          Review of Systems   Reason unable to perform ROS: per mom. Constitutional: Negative. Negative for diaphoresis, fatigue and fever. HENT: Negative. Eyes: Negative. Respiratory: Negative. Negative for cough. Cardiovascular: Negative. Negative for chest pain and palpitations. Gastrointestinal: Negative. Endocrine: Negative. Negative for cold intolerance and heat intolerance. Genitourinary: Negative. Negative for difficulty urinating and urgency. Musculoskeletal: Negative. Negative for arthralgias. Skin: Negative for wound. Allergic/Immunologic: Negative. Neurological: Positive for seizures. Negative for headaches. Hematological: Negative. Psychiatric/Behavioral: Positive for decreased concentration and dysphoric mood. Negative for sleep disturbance. The patient is not nervous/anxious. Objective:     /80 (Site: Right Upper Arm, Position: Sitting, Cuff Size: Large Adult)   Pulse 64   Temp 97 °F (36.1 °C)   Wt 266 lb 12.8 oz (121 kg)   SpO2 98%   BMI 38.28 kg/m²   Physical Exam  Vitals signs reviewed. Constitutional:       Appearance: He is well-developed. He is obese. HENT:      Head: Normocephalic and atraumatic. Comments: Dentition poor, noted broken molar bottom right. Neck:      Musculoskeletal: Normal range of motion. Cardiovascular:      Rate and Rhythm: Normal rate and regular rhythm. Heart sounds: Normal heart sounds. No murmur. Pulmonary:      Effort: Pulmonary effort is normal. No respiratory distress. Breath sounds: Normal breath sounds. Musculoskeletal: Normal range of motion. Skin:     General: Skin is warm and dry. Neurological:      Mental Status: He is alert. Psychiatric:         Behavior: Behavior normal.           Assessment:      1. Essential hypertension    2. Epilepsy, generalized, convulsive ([address]    3. Morbidly obese ([address]    4. Autistic disorder                     Plan:      No orders of the defined types were placed in this encounter. 1. Essential hypertension  well controlled, continue plan of care      2. Epilepsy, generalized, convulsive ([address]  3. Morbidly obese ([address]  4.  Autistic disorder  Will fill out form for transportation. He is following Dr. Pepito Dan for his neuro care. Return in about 6 months (around 2/5/2021) for HTN. Patient given educational materials - see patientinstructions. Discussed use, benefit, and side effects of prescribed medications. All patient questions answered. Pt voiced understanding. Reviewed health maintenance. Instructed to continue current medications, diet and exercise. Patient agreedwith treatment plan. Follow up as directed.      Electronically signed by ROBERTO Macias CNP on 8/5/2020

## 2020-08-26 ENCOUNTER — TELEPHONE (OUTPATIENT)
Dept: FAMILY MEDICINE CLINIC | Age: 25
End: 2020-08-26

## 2020-08-26 RX ORDER — CLINDAMYCIN HYDROCHLORIDE 300 MG/1
300 CAPSULE ORAL 3 TIMES DAILY
Qty: 21 CAPSULE | Refills: 0 | Status: SHIPPED | OUTPATIENT
Start: 2020-08-26 | End: 2020-09-02

## 2020-08-26 NOTE — TELEPHONE ENCOUNTER
Mom called, she said he is having tooth pain and soonest that oral surgeon can get him in is December, mom is asking if he can get antibiotic to treat it  ?   Please Advice

## 2020-10-05 NOTE — TELEPHONE ENCOUNTER
Please Approve or Refuse.   Send to Pharmacy per Pt's Request:      Next Visit Date: 01/27/21  Last Visit Date: 08/05/20    Hemoglobin A1C (%)   Date Value   08/22/2015 5.2             ( goal A1C is < 7)   BP Readings from Last 3 Encounters:   08/05/20 118/80   01/16/20 120/80   02/21/19 122/80          (goal 120/80)  BUN   Date Value Ref Range Status   01/16/2020 12 6 - 20 mg/dL Final     CREATININE   Date Value Ref Range Status   01/16/2020 0.94 0.70 - 1.20 mg/dL Final     Potassium   Date Value Ref Range Status   01/16/2020 3.6 (L) 3.7 - 5.3 mmol/L Final

## 2020-10-06 RX ORDER — HYDROCHLOROTHIAZIDE 12.5 MG/1
TABLET ORAL
Qty: 90 TABLET | Refills: 1 | Status: SHIPPED | OUTPATIENT
Start: 2020-10-06 | End: 2021-03-25

## 2020-10-06 RX ORDER — ATORVASTATIN CALCIUM 20 MG/1
TABLET, FILM COATED ORAL
Qty: 90 TABLET | Refills: 1 | Status: SHIPPED | OUTPATIENT
Start: 2020-10-06 | End: 2021-01-27 | Stop reason: SDUPTHER

## 2020-10-06 NOTE — TELEPHONE ENCOUNTER
Last visit: 8/5/2020  Last Med refill: 4/14/2020  Does patient have enough medication for 72 hours: Yes    Next Visit Date:  Future Appointments   Date Time Provider Cathy Thacker   1/27/2021  3:30 PM ROBERTO Victoria CNP [address] Maintenance   Topic Date Due    Varicella vaccine (1 of 2 - 2-dose childhood series) 04/08/1996    HPV vaccine (1 - Male 2-dose series) 04/08/2006    Flu vaccine (1) 09/01/2020    Lipid screen  01/16/2021    Potassium monitoring  01/16/2021    Creatinine monitoring  01/16/2021    DTaP/Tdap/Td vaccine (2 - Td) 10/27/2026    Hepatitis A vaccine  Aged Out    Hepatitis B vaccine  Aged Out    Hib vaccine  Aged Out    Meningococcal (ACWY) vaccine  Aged Out    Pneumococcal 0-64 years Vaccine  Aged Out    HIV screen  Discontinued       Hemoglobin A1C (%)   Date Value   08/22/2015 5.2             ( goal A1C is < 7)   No results found for: LABMICR  LDL Cholesterol (mg/dL)   Date Value   01/16/2020 64   02/21/2019 146 (H)       (goal LDL is <100)   AST (U/L)   Date Value   01/16/2020 26     ALT (U/L)   Date Value   01/16/2020 36     BUN (mg/dL)   Date Value   01/16/2020 12     BP Readings from Last 3 Encounters:   08/05/20 118/80   01/16/20 120/80   02/21/19 122/80          (goal 120/80)    All Future Testing planned in [facility]              Patient Active Problem List:     Seizure disorder [facility])     Mental developmental delay     Autistic disorder     Convulsion ([facility] [address]     Essential hypertension     Acne vulgaris     Mixed hyperlipidemia     Agitation     Epilepsy, generalized, convulsive ([address]     Morbidly obese ([address]

## 2021-01-27 ENCOUNTER — OFFICE VISIT (OUTPATIENT)
Dept: FAMILY MEDICINE CLINIC | Age: 26
End: 2021-01-27
Payer: COMMERCIAL

## 2021-01-27 VITALS
TEMPERATURE: 98.3 F | HEIGHT: 70 IN | WEIGHT: 285 LBS | BODY MASS INDEX: 40.8 KG/M2 | SYSTOLIC BLOOD PRESSURE: 126 MMHG | DIASTOLIC BLOOD PRESSURE: 80 MMHG | HEART RATE: 95 BPM | OXYGEN SATURATION: 98 %

## 2021-01-27 DIAGNOSIS — E66.01 MORBIDLY OBESE (HCC): ICD-10-CM

## 2021-01-27 DIAGNOSIS — F81.9 MENTAL DEVELOPMENTAL DELAY: ICD-10-CM

## 2021-01-27 DIAGNOSIS — J34.3 NASAL TURBINATE HYPERTROPHY: ICD-10-CM

## 2021-01-27 DIAGNOSIS — G40.309 EPILEPSY, GENERALIZED, CONVULSIVE (HCC): ICD-10-CM

## 2021-01-27 DIAGNOSIS — I10 ESSENTIAL HYPERTENSION: Primary | ICD-10-CM

## 2021-01-27 DIAGNOSIS — E78.2 MIXED HYPERLIPIDEMIA: ICD-10-CM

## 2021-01-27 DIAGNOSIS — H61.23 BILATERAL IMPACTED CERUMEN: ICD-10-CM

## 2021-01-27 PROCEDURE — G8484 FLU IMMUNIZE NO ADMIN: HCPCS | Performed by: NURSE PRACTITIONER

## 2021-01-27 PROCEDURE — G8427 DOCREV CUR MEDS BY ELIG CLIN: HCPCS | Performed by: NURSE PRACTITIONER

## 2021-01-27 PROCEDURE — 99214 OFFICE O/P EST MOD 30 MIN: CPT | Performed by: NURSE PRACTITIONER

## 2021-01-27 PROCEDURE — G8417 CALC BMI ABV UP PARAM F/U: HCPCS | Performed by: NURSE PRACTITIONER

## 2021-01-27 PROCEDURE — 1036F TOBACCO NON-USER: CPT | Performed by: NURSE PRACTITIONER

## 2021-01-27 RX ORDER — ATORVASTATIN CALCIUM 20 MG/1
TABLET, FILM COATED ORAL
Qty: 90 TABLET | Refills: 3 | Status: SHIPPED | OUTPATIENT
Start: 2021-01-27 | End: 2022-02-25 | Stop reason: SDUPTHER

## 2021-01-27 ASSESSMENT — ENCOUNTER SYMPTOMS
COUGH: 0
ALLERGIC/IMMUNOLOGIC NEGATIVE: 1
RESPIRATORY NEGATIVE: 1
GASTROINTESTINAL NEGATIVE: 1
EYES NEGATIVE: 1

## 2021-01-27 NOTE — PROGRESS NOTES
Visit Information    Have you changed or started any medications since your last visit including any over-the-counter medicines, vitamins, or herbal medicines? no   Are you having any side effects from any of your medications? -  no  Have you stopped taking any of your medications? Is so, why? -  no    Have you seen any other physician or provider since your last visit? No  Have you had any other diagnostic tests since your last visit? No  Have you been seen in the emergency room and/or had an admission to a hospital since we last saw you? No  Have you had your routine dental cleaning in the past 6 months? no    Have you activated your [employer] account? If not, what are your barriers?  Yes     Patient Care Team:  ROBERTO Preciado CNP as PCP - General (Certified Nurse Practitioner)  ROBERTO Preciado CNP as PCP - [facility]  Amos Linton MD as Consulting Physician (Neurology)  ROBERTO Preciado CNP, MD as Surgeon (General Surgery)    Medical History Review  Past Medical, Family, and Social History reviewed and does contribute to the patient presenting condition    Health Maintenance   Topic Date Due    Varicella vaccine (1 of 2 - 2-dose childhood series) 04/08/1996    HPV vaccine (1 - Male 2-dose series) 04/08/2006    Flu vaccine (1) 09/01/2020    Potassium monitoring  01/16/2021    Creatinine monitoring  01/16/2021    Lipid screen  01/16/2021    DTaP/Tdap/Td vaccine (2 - Td) 10/27/2026    Hepatitis C screen  Completed    Hepatitis A vaccine  Aged Out    Hepatitis B vaccine  Aged Out    Hib vaccine  Aged Out    Meningococcal (ACWY) vaccine  Aged Out    Pneumococcal 0-64 years Vaccine  Aged Out    HIV screen  Discontinued

## 2021-01-27 NOTE — PROGRESS NOTES
[address]  [facility].  [address] [place] [address]  Dept: [phone]  Dept Fax: [phone]      Lorrin Boas is a 22 y.o. male who presents today for hismedical conditions/complaints as noted below. Lorrin Boas is c/o of Hypertension            HPI:      HPI  HTN- BP is well controlled today. Compliant with taking medications. Denies chest pain, dyspnea, edema, or HA. Developmental delay-some verbal responses, mom informant at appt. Has been having nasal congestion. Mom feels that his breathing is heavy when he is eating. Denies recent illness. She does have flonase at home but he has not been using it. Seizure disorder-continues on keppra. Seeing Dr. Renay Calvo, no seizure activity.    Hemoglobin A1C (%)   Date Value   08/22/2015 5.2             ( goal A1Cis < 7)   No results found for: LABMICR  LDL Cholesterol (mg/dL)   Date Value   01/16/2020 64   02/21/2019 146 (H)       (goal LDL is <100)   AST (U/L)   Date Value   01/16/2020 26     ALT (U/L)   Date Value   01/16/2020 36     BUN (mg/dL)   Date Value   01/16/2020 12     BP Readings from Last 3 Encounters:   01/27/21 126/80   08/05/20 118/80   01/16/20 120/80          (goal 120/80)    Past Medical History:   Diagnosis Date    Autism     Mental developmental delay 8/9/2012    Seizure disorder ([facility] [address] 8/9/2012    [facility]       Past Surgical History:   Procedure Laterality Date    TOE SURGERY         Family History   Problem Relation Age of Onset    High Blood Pressure Mother     Diabetes Mother     High Cholesterol Mother     High Blood Pressure Father     Heart Disease Maternal Grandmother     Heart Disease Maternal Grandfather           Social History     Tobacco Use    Smoking status: Never Smoker    Smokeless tobacco: Never Used   Substance Use Topics    Alcohol use: No         Current Outpatient Medications   Medication Sig Dispense Refill  atorvastatin (LIPITOR) 20 MG tablet TAKE 1 TABLET BY MOUTH ONE TIME A DAY 90 tablet 3    carbamide peroxide (DEBROX) 6.5 % otic solution Place 5 drops in ear(s) 2 times daily 15 mL 1    hydroCHLOROthiazide (HYDRODIURIL) 12.5 MG tablet TAKE 1 TABLET BY MOUTH ONCE DAILY 90 tablet 1    risperiDONE (RISPERDAL) 1 MG tablet Take 1 tablet by mouth nightly      Benzoyl Peroxide (BENZOYL PEROXIDE) 10 % external wash APPLY TOPICALLY TWICE DAILY 227 g 0    vitamin B-6 (PYRIDOXINE) 100 MG tablet Take 50 mg by mouth daily      levETIRAcetam (KEPPRA XR) 500 MG TB24 extended release tablet 500 mg 5 times daily   3    OXTELLAR  MG TB24 Take 600 mg by mouth 4 times daily   3    Melatonin 5 MG TABS tablet Take 1.5 mg by mouth daily Takes 1/2 of 5mg tab= 2.5mg daily       Pyridoxine HCl (VITAMIN B-6) 100 MG tablet Take 100 mg by mouth daily.  clindamycin (CLEOCIN) 75 MG/5ML solution TAKE 20 MLS BY MOUTH THREE TIMES A DAY FOR 7 DAYS   DISCARD REMAINDER      doxycycline hyclate (VIBRAMYCIN) 100 MG capsule TAKE 1 CAPSULE BY MOUTH TWICE DAILY WITH FOOD FOR 10 DAYS *AVOID DAIRY CALCIUM AND MTV'S 2 HOURS BEFORE AND AFTER DOSE* (Patient not taking: Reported on 1/16/2020) 20 capsule 10     No current facility-administered medications for this visit.       Allergies   Allergen Reactions    Azithromycin     Bee Venom        Health Maintenance   Topic Date Due    Potassium monitoring  01/16/2021    Creatinine monitoring  01/16/2021    Lipid screen  01/16/2021    HPV vaccine (1 - Male 2-dose series) 01/27/2022 (Originally 4/8/2006)    Flu vaccine (1) 01/27/2022 (Originally 9/1/2020)    DTaP/Tdap/Td vaccine (2 - Td) 10/27/2026    Hepatitis C screen  Completed    Hepatitis A vaccine  Aged Out    Hepatitis B vaccine  Aged Out    Hib vaccine  Aged Out    Meningococcal (ACWY) vaccine  Aged Out    Pneumococcal 0-64 years Vaccine  Aged Out    Varicella vaccine  Discontinued    HIV screen  Discontinued Review of Systems   Reason unable to perform ROS: per mom. Constitutional: Negative. Negative for diaphoresis, fatigue and fever. HENT: Positive for congestion. Negative for ear pain. Eyes: Negative. Respiratory: Negative. Negative for cough. Cardiovascular: Negative. Negative for chest pain and palpitations. Gastrointestinal: Negative. Endocrine: Negative. Negative for cold intolerance and heat intolerance. Genitourinary: Negative. Negative for difficulty urinating and urgency. Musculoskeletal: Negative. Negative for arthralgias. Skin: Negative for wound. Allergic/Immunologic: Negative. Neurological: Positive for seizures. Negative for headaches. Hematological: Negative. Psychiatric/Behavioral: Positive for decreased concentration and dysphoric mood. Negative for sleep disturbance. The patient is not nervous/anxious. Objective:     /80   Pulse 95   Temp 98.3 °F (36.8 °C)   Ht 5' 10\" (1.778 m)   Wt 285 lb (129.3 kg)   SpO2 98%   BMI 40.89 kg/m²   Physical Exam  Vitals signs reviewed. Constitutional:       Appearance: He is well-developed. He is obese. HENT:      Head: Normocephalic and atraumatic. Right Ear: There is impacted cerumen. Left Ear: There is impacted cerumen. Nose: Mucosal edema and congestion present. Right Turbinates: Enlarged and swollen. Left Turbinates: Enlarged and swollen. Neck:      Musculoskeletal: Normal range of motion. Cardiovascular:      Rate and Rhythm: Normal rate and regular rhythm. Heart sounds: Normal heart sounds. No murmur. Pulmonary:      Effort: Pulmonary effort is normal. No respiratory distress. Breath sounds: Normal breath sounds. Musculoskeletal: Normal range of motion. Skin:     General: Skin is warm and dry. Neurological:      Mental Status: He is alert. Psychiatric:         Behavior: Behavior normal.           Assessment:      1.  Essential hypertension

## 2021-03-24 DIAGNOSIS — I10 ESSENTIAL HYPERTENSION: ICD-10-CM

## 2021-03-25 RX ORDER — HYDROCHLOROTHIAZIDE 12.5 MG/1
TABLET ORAL
Qty: 30 TABLET | Refills: 11 | Status: SHIPPED | OUTPATIENT
Start: 2021-03-25 | End: 2022-04-12 | Stop reason: SDUPTHER

## 2021-04-01 ENCOUNTER — HOSPITAL ENCOUNTER (OUTPATIENT)
Age: 26
Discharge: HOME | End: 2021-04-01
Payer: COMMERCIAL

## 2021-04-01 DIAGNOSIS — E78.2 MIXED HYPERLIPIDEMIA: ICD-10-CM

## 2021-04-01 DIAGNOSIS — I10 ESSENTIAL HYPERTENSION: ICD-10-CM

## 2021-04-01 LAB
ANION GAP SERPL CALCULATED.3IONS-SCNC: 9 MMOL/L (ref 9–17)
BUN BLDV-MCNC: 15 MG/DL (ref 6–20)
BUN/CREAT BLD: NORMAL (ref 9–20)
CALCIUM SERPL-MCNC: 9.8 MG/DL (ref 8.6–10.4)
CHLORIDE BLD-SCNC: 101 MMOL/L (ref 98–107)
CHOLESTEROL, FASTING: 161 MG/DL
CHOLESTEROL/HDL RATIO: 3.7
CO2: 28 MMOL/L (ref 20–31)
CREAT SERPL-MCNC: 0.85 MG/DL (ref 0.7–1.2)
GFR AFRICAN AMERICAN: >60 ML/MIN
GFR NON-AFRICAN AMERICAN: >60 ML/MIN
GFR SERPL CREATININE-BSD FRML MDRD: NORMAL ML/MIN/{1.73_M2}
GFR SERPL CREATININE-BSD FRML MDRD: NORMAL ML/MIN/{1.73_M2}
GLUCOSE BLD-MCNC: 93 MG/DL (ref 70–99)
HDLC SERPL-MCNC: 44 MG/DL
LDL CHOLESTEROL: 101 MG/DL (ref 0–130)
POTASSIUM SERPL-SCNC: 4.1 MMOL/L (ref 3.7–5.3)
SODIUM BLD-SCNC: 138 MMOL/L (ref 135–144)
TRIGLYCERIDE, FASTING: 81 MG/DL
VLDLC SERPL CALC-MCNC: NORMAL MG/DL (ref 1–30)

## 2021-04-01 PROCEDURE — 80061 LIPID PANEL: CPT

## 2021-04-01 PROCEDURE — 36415 COLL VENOUS BLD VENIPUNCTURE: CPT

## 2021-04-01 PROCEDURE — 80048 BASIC METABOLIC PNL TOTAL CA: CPT

## 2021-08-25 ENCOUNTER — NURSE TRIAGE (OUTPATIENT)
Dept: OTHER | Facility: CLINIC | Age: 26
End: 2021-08-25

## 2021-08-25 NOTE — TELEPHONE ENCOUNTER
Reason for Disposition   MILD swelling of both ankles (i.e., pedal edema) AND new onset or worsening    Answer Assessment - Initial Assessment Questions  1. ONSET: \"When did the swelling start? \" (e.g., minutes, hours, days)      2 months    2. LOCATION: \"What part of the leg is swollen? \"  \"Are both legs swollen or just one leg? \"      Ankle and foot- bilateral but right is worse    3. SEVERITY: \"How bad is the swelling? \" (e.g., localized; mild, moderate, severe)   - Localized - small area of swelling localized to one leg   - MILD pedal edema - swelling limited to foot and ankle, pitting edema < 1/4 inch (6 mm) deep, rest and elevation eliminate most or all swelling   - MODERATE edema - swelling of lower leg to knee, pitting edema > 1/4 inch (6 mm) deep, rest and elevation only partially reduce swelling   - SEVERE edema - swelling extends above knee, facial or hand swelling present       mild with pitting    4. REDNESS: \"Does the swelling look red or infected? \"      No    5. PAIN: \"Is the swelling painful to touch? \" If so, ask: \"How painful is it? \"   (Scale 1-10; mild, moderate or severe)      Denies    6. FEVER: \"Do you have a fever? \" If so, ask: \"What is it, how was it measured, and when did it start? \"       No    7. CAUSE: \"What do you think is causing the leg swelling? \"      Mom thinks it is from high blood pressure and wants him checked for diabetes. 8. MEDICAL HISTORY: \"Do you have a history of heart failure, kidney disease, liver failure, or cancer? \"      On htn meds, autistic    9. RECURRENT SYMPTOM: \"Have you had leg swelling before? \" If so, ask: \"When was the last time? \" \"What happened that time? \"      Past 2 months    10. OTHER SYMPTOMS: \"Do you have any other symptoms? \" (e.g., chest pain, difficulty breathing)        Had a cut 7/9 on right leg, went to [facility] earlier this month and started antibiotics for the cut    11. PREGNANCY: \"Is there any chance you are pregnant? \" \"When was your last menstrual period? \"        N/a    Protocols used: LEG SWELLING AND EDEMA-ADULT-OH    Received call from Leobardo Jorgensen at [facility] with "[employer]. Brief description of triage: bilateral ankle and foot edema x 2 months, had recent cut to right lower leg and was treated with antibiotics. Triage indicates for patient to be seen within 3 days in office. Care advice provided, patient verbalizes understanding; denies any other questions or concerns; instructed to call back for any new or worsening symptoms. Writer provided warm transfer to Eloina Marvin at [facility] for appointment scheduling. Attention Provider: Thank you for allowing me to participate in the care of your patient. The patient was connected to triage in response to information provided to the ECC. Please do not respond through this encounter as the response is not directed to a shared pool.

## 2021-08-26 ENCOUNTER — HOSPITAL ENCOUNTER (OUTPATIENT)
Dept: GENERAL RADIOLOGY | Age: 26
Discharge: HOME | End: 2021-08-28
Payer: COMMERCIAL

## 2021-08-26 ENCOUNTER — HOSPITAL ENCOUNTER (OUTPATIENT)
Age: 26
Discharge: HOME | End: 2021-08-26
Payer: COMMERCIAL

## 2021-08-26 ENCOUNTER — OFFICE VISIT (OUTPATIENT)
Dept: FAMILY MEDICINE CLINIC | Age: 26
End: 2021-08-26
Payer: COMMERCIAL

## 2021-08-26 ENCOUNTER — HOSPITAL ENCOUNTER (OUTPATIENT)
Age: 26
Discharge: HOME | End: 2021-08-28
Payer: COMMERCIAL

## 2021-08-26 VITALS
HEART RATE: 84 BPM | OXYGEN SATURATION: 97 % | HEIGHT: 70 IN | WEIGHT: 307 LBS | SYSTOLIC BLOOD PRESSURE: 120 MMHG | BODY MASS INDEX: 43.95 KG/M2 | TEMPERATURE: 97.6 F | DIASTOLIC BLOOD PRESSURE: 80 MMHG

## 2021-08-26 DIAGNOSIS — I10 ESSENTIAL HYPERTENSION: ICD-10-CM

## 2021-08-26 DIAGNOSIS — L03.115 CELLULITIS OF RIGHT LOWER EXTREMITY: ICD-10-CM

## 2021-08-26 DIAGNOSIS — T14.90XA INJURY: ICD-10-CM

## 2021-08-26 DIAGNOSIS — E66.01 MORBIDLY OBESE (HCC): ICD-10-CM

## 2021-08-26 DIAGNOSIS — F84.0 AUTISTIC DISORDER: ICD-10-CM

## 2021-08-26 DIAGNOSIS — L03.115 CELLULITIS OF RIGHT LOWER EXTREMITY: Primary | ICD-10-CM

## 2021-08-26 LAB
ANION GAP SERPL CALCULATED.3IONS-SCNC: 10 MMOL/L (ref 9–17)
BUN BLDV-MCNC: 13 MG/DL (ref 6–20)
BUN/CREAT BLD: ABNORMAL (ref 9–20)
CALCIUM SERPL-MCNC: 9.4 MG/DL (ref 8.6–10.4)
CHLORIDE BLD-SCNC: 105 MMOL/L (ref 98–107)
CO2: 24 MMOL/L (ref 20–31)
CREAT SERPL-MCNC: 1.01 MG/DL (ref 0.7–1.2)
ESTIMATED AVERAGE GLUCOSE: 94 MG/DL
GFR AFRICAN AMERICAN: >60 ML/MIN
GFR NON-AFRICAN AMERICAN: >60 ML/MIN
GFR SERPL CREATININE-BSD FRML MDRD: ABNORMAL ML/MIN/{1.73_M2}
GFR SERPL CREATININE-BSD FRML MDRD: ABNORMAL ML/MIN/{1.73_M2}
GLUCOSE BLD-MCNC: 108 MG/DL (ref 70–99)
HBA1C MFR BLD: 4.9 % (ref 4–6)
POTASSIUM SERPL-SCNC: 3.6 MMOL/L (ref 3.7–5.3)
SODIUM BLD-SCNC: 139 MMOL/L (ref 135–144)

## 2021-08-26 PROCEDURE — 99214 OFFICE O/P EST MOD 30 MIN: CPT | Performed by: FAMILY MEDICINE

## 2021-08-26 PROCEDURE — 80048 BASIC METABOLIC PNL TOTAL CA: CPT

## 2021-08-26 PROCEDURE — G8417 CALC BMI ABV UP PARAM F/U: HCPCS | Performed by: FAMILY MEDICINE

## 2021-08-26 PROCEDURE — G8427 DOCREV CUR MEDS BY ELIG CLIN: HCPCS | Performed by: FAMILY MEDICINE

## 2021-08-26 PROCEDURE — 83036 HEMOGLOBIN GLYCOSYLATED A1C: CPT

## 2021-08-26 PROCEDURE — 1036F TOBACCO NON-USER: CPT | Performed by: FAMILY MEDICINE

## 2021-08-26 PROCEDURE — 73590 X-RAY EXAM OF LOWER LEG: CPT

## 2021-08-26 PROCEDURE — 36415 COLL VENOUS BLD VENIPUNCTURE: CPT

## 2021-08-26 RX ORDER — DOXYCYCLINE 100 MG/1
100 TABLET ORAL 2 TIMES DAILY
Qty: 20 TABLET | Refills: 0 | Status: SHIPPED | OUTPATIENT
Start: 2021-08-26 | End: 2021-10-20

## 2021-08-26 ASSESSMENT — PATIENT HEALTH QUESTIONNAIRE - PHQ9
1. LITTLE INTEREST OR PLEASURE IN DOING THINGS: 0
2. FEELING DOWN, DEPRESSED OR HOPELESS: 0
SUM OF ALL RESPONSES TO PHQ9 QUESTIONS 1 & 2: 0
SUM OF ALL RESPONSES TO PHQ QUESTIONS 1-9: 0

## 2021-08-26 ASSESSMENT — ENCOUNTER SYMPTOMS
WHEEZING: 0
ABDOMINAL DISTENTION: 0
COUGH: 0
CONSTIPATION: 0
DIARRHEA: 0
BLOOD IN STOOL: 0
CHEST TIGHTNESS: 0

## 2021-08-26 NOTE — PROGRESS NOTES
Visit Information    Have you changed or started any medications since your last visit including any over-the-counter medicines, vitamins, or herbal medicines? no   Are you having any side effects from any of your medications? -  no  Have you stopped taking any of your medications? Is so, why? -  no    Have you seen any other physician or provider since your last visit? Yes urgent care   Have you had any other diagnostic tests since your last visit? No  Have you been seen in the emergency room and/or had an admission to a hospital since we last saw you? No  Have you had your routine dental cleaning in the past 6 months? no    Have you activated your [employer] account? If not, what are your barriers?  Yes     Patient Care Team:  ROBERTO Plascencia CNP as PCP - General (Certified Nurse Practitioner)  ROBERTO Plascencia CNP as PCP - [facility]  Melchor Caballero MD as Consulting Physician (Neurology)  ROBERTO Plascencia CNP, MD as Surgeon (General Surgery)    Medical History Review  Past Medical, Family, and Social History reviewed and does contribute to the patient presenting condition    Health Maintenance   Topic Date Due    COVID-19 Vaccine (1) Never done    HPV vaccine (1 - Male 2-dose series) 01/27/2022 (Originally 4/8/2006)    Flu vaccine (1) 09/01/2021    Lipid screen  04/01/2022    Potassium monitoring  04/01/2022    Creatinine monitoring  04/01/2022    DTaP/Tdap/Td vaccine (2 - Td or Tdap) 10/27/2026    Hepatitis C screen  Completed    Hepatitis A vaccine  Aged Out    Hepatitis B vaccine  Aged Out    Hib vaccine  Aged Out    Meningococcal (ACWY) vaccine  Aged Out    Pneumococcal 0-64 years Vaccine  Aged Out    Varicella vaccine  Discontinued    HIV screen  Discontinued

## 2021-08-26 NOTE — PROGRESS NOTES
Chief Complaint   Patient presents with    Swelling     x 2 months rt foot has an injury - happened july 9th          [facility]  here today for follow up on chronic medical problems, go over labs and/or diagnostic studies, and medication refills. Swelling (x 2 months rt foot has an injury - happened july 9th )      HPI:  Patient presented with her mom for right leg wound and swelling. Mom reports he had a fall and hit his right leg 2 months before in the beginning of July. He has not seen anyone since then but the wound was not healing. He saw in the beginning of August urgent care and was given antibiotic. Mom reports wound is mildly healed but still oozing. He is continuously using dressing. She also noticed right leg swelling denies any fever chills. He did not had any x-rays done. Patient has history of autistic disorder and is stable is on medications. Hypertension controlled. /80   Pulse 84   Temp 97.6 °F (36.4 °C)   Ht 5' 10\" (1.778 m)   Wt (!) 307 lb (139.3 kg)   SpO2 97%   BMI 44.05 kg/m²    Body mass index is 44.05 kg/m². Wt Readings from Last 3 Encounters:   08/26/21 (!) 307 lb (139.3 kg)   01/27/21 285 lb (129.3 kg)   08/05/20 266 lb 12.8 oz (121 kg)        [x]Negative depression screening. PHQ Scores 8/26/2021 8/5/2020 2/21/2019 8/7/2017   PHQ2 Score 0 0 0 0   PHQ9 Score 0 0 0 0      []1-4 = Minimal depression   []5-9 = Milddepression   []10-14 = Moderate depression   []15-19 = Moderately severe depression   []20-27 = Severe depression    Discussed testing with the patient and all questions fully answered.     Hospital Outpatient Visit on 04/01/2021   Component Date Value Ref Range Status    Cholesterol, Fasting 04/01/2021 161  <200 mg/dL Final    Comment:    Cholesterol Guidelines:      <200  Desirable   200-240  Borderline      >240  Undesirable         HDL 04/01/2021 44  >40 mg/dL Final    Comment:    HDL Guidelines:    <40     Undesirable   40-59 Borderline    >59     Desirable         LDL Cholesterol 04/01/2021 101  0 - 130 mg/dL Final    Comment:    LDL Guidelines:     <100    Desirable   100-129   Near to/above Desirable   130-159   Borderline      >159   Undesirable     Direct (measured) LDL and calculated LDL are not interchangeable tests.  Chol/HDL Ratio 04/01/2021 3.7  <5 Final            Triglyceride, Fasting 04/01/2021 81  <150 mg/dL Final    Comment:    Triglyceride Guidelines:     <150   Desirable   150-199  Borderline   200-499  High     >499   Very high   Based on AHA Guidelines for fasting triglyceride, October 2012.  VLDL 04/01/2021 NOT REPORTED  1 - 30 mg/dL Final    Glucose 04/01/2021 93  70 - 99 mg/dL Final    BUN 04/01/2021 15  6 - 20 mg/dL Final    CREATININE 04/01/2021 0.85  0.70 - 1.20 mg/dL Final    Bun/Cre Ratio 04/01/2021 NOT REPORTED  9 - 20 Final    Calcium 04/01/2021 9.8  8.6 - 10.4 mg/dL Final    Sodium 04/01/2021 138  135 - 144 mmol/L Final    Potassium 04/01/2021 4.1  3.7 - 5.3 mmol/L Final    Chloride 04/01/2021 101  98 - 107 mmol/L Final    CO2 04/01/2021 28  20 - 31 mmol/L Final    Anion Gap 04/01/2021 9  9 - 17 mmol/L Final    GFR Non- 04/01/2021 >60  >60 mL/min Final    GFR  04/01/2021 >60  >60 mL/min Final    GFR Comment 04/01/2021        Final    Comment: Average GFR for 20-28 years old:   116 mL/min/1.73sq m  Chronic Kidney Disease:   <60 mL/min/1.73sq m  Kidney failure:   <15 mL/min/1.73sq m              eGFR calculated using average adult body mass.  Additional eGFR calculator available at:        [email or web address]            GFR Staging 04/01/2021 NOT REPORTED   Final         Most recent labs reviewed:     Lab Results   Component Value Date    WBC 7.6 01/16/2020    HGB 14.7 01/16/2020    HCT 44.6 01/16/2020    MCV 89.6 01/16/2020     01/16/2020       @BRIEFLAB(NA,K,CL,CO2,BUN,CREATININE,GLUCOSE,CALCIUM)@     Lab Results Component Value Date    ALT 36 01/16/2020    AST 26 01/16/2020    ALKPHOS 80 01/16/2020    BILITOT 0.17 (L) 01/16/2020       Lab Results   Component Value Date    TSH 3.28 08/22/2015       Lab Results   Component Value Date    CHOL 137 01/16/2020    CHOL 202 (H) 02/21/2019     Lab Results   Component Value Date    TRIG 177 (H) 01/16/2020    TRIG 97 02/21/2019     Lab Results   Component Value Date    HDL 44 04/01/2021    HDL 38 (L) 01/16/2020    HDL 37 (L) 02/21/2019     Lab Results   Component Value Date    LDLCHOLESTEROL 101 04/01/2021    LDLCHOLESTEROL 64 01/16/2020    LDLCHOLESTEROL 146 (H) 02/21/2019     Lab Results   Component Value Date    VLDL NOT REPORTED 04/01/2021    VLDL NOT REPORTED (H) 01/16/2020    VLDL NOT REPORTED 02/21/2019     Lab Results   Component Value Date    CHOLHDLRATIO 3.7 04/01/2021    CHOLHDLRATIO 3.6 01/16/2020    CHOLHDLRATIO 5.5 (H) 02/21/2019       Lab Results   Component Value Date    LABA1C 5.2 08/22/2015       No results found for: ACJHRSYR89    No results found for: FOLATE    No results found for: IRON, TIBC, FERRITIN    No results found for: VITD25          Current Outpatient Medications   Medication Sig Dispense Refill    doxycycline monohydrate (ADOXA) 100 MG tablet Take 1 tablet by mouth 2 times daily With food 20 tablet 0    hydroCHLOROthiazide (HYDRODIURIL) 12.5 MG tablet TAKE (1) TABLET BY MOUTH ONCE DAILY 30 tablet 11    atorvastatin (LIPITOR) 20 MG tablet TAKE 1 TABLET BY MOUTH ONE TIME A DAY 90 tablet 3    risperiDONE (RISPERDAL) 1 MG tablet Take 1 tablet by mouth nightly      Benzoyl Peroxide (BENZOYL PEROXIDE) 10 % external wash APPLY TOPICALLY TWICE DAILY 227 g 0    levETIRAcetam (KEPPRA XR) 500 MG TB24 extended release tablet 500 mg 5 times daily   3    OXTELLAR  MG TB24 Take 600 mg by mouth 4 times daily   3    Melatonin 5 MG TABS tablet Take 1.5 mg by mouth daily Takes 1/2 of 5mg tab= 2.5mg daily       Pyridoxine HCl (VITAMIN B-6) 100 MG tablet Take 100 mg by mouth daily. No current facility-administered medications for this visit. Social History     Socioeconomic History    Marital status: Single     Spouse name: Not on file    Number of children: Not on file    Years of education: Not on file    Highest education level: Not on file   Occupational History    Not on file   Tobacco Use    Smoking status: Never Smoker    Smokeless tobacco: Never Used   Substance and Sexual Activity    Alcohol use: No    Drug use: No    Sexual activity: Not on file   Other Topics Concern    Not on file   Social History Narrative    ** Merged History Encounter **          Social Determinants of Health     Financial Resource Strain:     Difficulty of Paying Living Expenses:    Food Insecurity:     Worried About Running Out of Food in the Last Year:     [address] in the Last Year:    Transportation Needs:     Lack of Transportation (Medical):      Lack of Transportation (Non-Medical):    Physical Activity:     Days of Exercise per Week:     Minutes of Exercise per Session:    Stress:     Feeling of Stress :    Social Connections:     Frequency of Communication with Friends and Family:     Frequency of Social Gatherings with Friends and Family:     Attends Mu[employer] Services:     Active Member of Clubs or Organizations:     Attends Club or Organization Meetings:     Marital Status:    Intimate Partner Violence:     Fear of Current or Ex-Partner:     Emotionally Abused:     Physically Abused:     Sexually Abused:      Counseling given: Not Answered        Family History   Problem Relation Age of Onset    High Blood Pressure Mother     Diabetes Mother     High Cholesterol Mother     High Blood Pressure Father     Heart Disease Maternal Grandmother     Heart Disease Maternal Grandfather              -rest of complaints with corresponding details per ROS    The patient's past medical, surgical, social, and family history as Yes    Requested Prescriptions     Signed Prescriptions Disp Refills    doxycycline monohydrate (ADOXA) 100 MG tablet 20 tablet 0     Sig: Take 1 tablet by mouth 2 times daily With food       All patient questions answered. Patient voiced understanding. Quality Measures    Body mass index is 44.05 kg/m². Elevated. Weight control planned discussed daily exercise regimen and Healthy diet and regular exercise. BP: 120/80 Blood pressure is normal. Treatment plan consists of No treatment change needed. Lab Results   Component Value Date    LDLCHOLESTEROL 101 04/01/2021    (goal LDL reduction with dx if diabetes is 50% LDL reduction)      PHQ Scores 8/26/2021 8/5/2020 2/21/2019 8/7/2017   PHQ2 Score 0 0 0 0   PHQ9 Score 0 0 0 0     Interpretation of Total Score Depression Severity: 1-4 = Minimal depression, 5-9 = Mild depression, 10-14 = Moderate depression, 15-19 = Moderately severe depression, 20-27 = Severe depression    The patient'spast medical, surgical, social, and family history as well as his   current medications and allergies were reviewed as documented in today's encounter. Medications, labs, diagnostic studies, consultations andfollow-up as documented in this encounter. Return in about 3 months (around 11/26/2021) for annual exam 30min. Patient wasseen with total face to face time of 30 minutes. More than 50% of this visit was counseling and education. No future appointments. This note was completed by using the assistance of a speech-recognition program. However, inadvertent computerized transcription errors may be present. Althoughevery effort was made to ensure accuracy, no guarantees can be provided that every mistake has been identified and corrected by editing.   Electronically signed by Maximo Acevedo MD on 8/26/2021  9:25 AM

## 2021-09-03 ENCOUNTER — HOSPITAL ENCOUNTER (OUTPATIENT)
Dept: WOUND CARE | Age: 26
Discharge: HOME | End: 2021-09-03
Payer: COMMERCIAL

## 2021-09-03 VITALS
DIASTOLIC BLOOD PRESSURE: 101 MMHG | SYSTOLIC BLOOD PRESSURE: 171 MMHG | HEIGHT: 70 IN | BODY MASS INDEX: 43.95 KG/M2 | TEMPERATURE: 97.4 F | WEIGHT: 307 LBS | HEART RATE: 69 BPM | RESPIRATION RATE: 16 BRPM

## 2021-09-03 DIAGNOSIS — R60.0 LOCALIZED EDEMA: ICD-10-CM

## 2021-09-03 DIAGNOSIS — T79.9XXS TRAUMA COMPLICATION, LATE EFFECT: ICD-10-CM

## 2021-09-03 PROCEDURE — 99213 OFFICE O/P EST LOW 20 MIN: CPT

## 2021-09-03 PROCEDURE — 29580 STRAPPING UNNA BOOT: CPT

## 2021-09-03 ASSESSMENT — PAIN DESCRIPTION - ORIENTATION: ORIENTATION: RIGHT;LOWER

## 2021-09-03 ASSESSMENT — PAIN SCALES - WONG BAKER: WONGBAKER_NUMERICALRESPONSE: 4

## 2021-09-03 ASSESSMENT — PAIN DESCRIPTION - LOCATION: LOCATION: LEG

## 2021-09-03 NOTE — PROGRESS NOTES
[address]   Progress Note and Procedure Note      [address] [facility] RECORD NUMBER:  [id number]  AGE: 32 y.o. GENDER: male  : 1995  EPISODE DATE:  9/3/2021    Subjective:     Chief Complaint   Patient presents with    Wound Check     Right pretib         HISTORY of PRESENT ILLNESS HPI     Piter Sahu is a 32 y.o. male who presents today for wound/ulcer evaluation. History of Wound Context: Patient presents with his mother for evaluation of right lower leg wound. She states that he fell onto concrete in July and the wound has been open since. He is currently on doxycycline per his PCP. X-ray was obtained of the right tib-fib which was negative for fracture. Patient's mother relates that he continues to have edema in the right ankle. States that he does not limp at home or complain of any pain.   Patient suffers from autism and is nonverbal.    Ulcer Identification:  Ulcer Type: traumatic  Contributing Factors: edema          PAST MEDICAL HISTORY        Diagnosis Date    Autism     Mental developmental delay 2012    Seizure disorder ([facility] [address] 2012    Seizures ([facility] [address]        PAST SURGICAL HISTORY    Past Surgical History:   Procedure Laterality Date    TOE SURGERY         FAMILY HISTORY    Family History   Problem Relation Age of Onset    High Blood Pressure Mother     Diabetes Mother     High Cholesterol Mother     High Blood Pressure Father     Heart Disease Maternal Grandmother     Heart Disease Maternal Grandfather        SOCIAL HISTORY    Social History     Tobacco Use    Smoking status: Never Smoker    Smokeless tobacco: Never Used   Vaping Use    Vaping Use: Never used   Substance Use Topics    Alcohol use: No    Drug use: No       ALLERGIES    Allergies   Allergen Reactions    Azithromycin     Bee Venom        MEDICATIONS    Current Outpatient Medications on File Prior to Encounter   Medication Sig Dispense Refill    doxycycline monohydrate (ADOXA) 100 MG tablet Take 1 tablet by mouth 2 times daily With food 20 tablet 0    hydroCHLOROthiazide (HYDRODIURIL) 12.5 MG tablet TAKE (1) TABLET BY MOUTH ONCE DAILY 30 tablet 11    atorvastatin (LIPITOR) 20 MG tablet TAKE 1 TABLET BY MOUTH ONE TIME A DAY 90 tablet 3    risperiDONE (RISPERDAL) 1 MG tablet Take 1 tablet by mouth nightly      Benzoyl Peroxide (BENZOYL PEROXIDE) 10 % external wash APPLY TOPICALLY TWICE DAILY 227 g 0    levETIRAcetam (KEPPRA XR) 500 MG TB24 extended release tablet 500 mg 5 times daily   3    OXTELLAR  MG TB24 Take 600 mg by mouth 4 times daily   3    Melatonin 5 MG TABS tablet Take 1.5 mg by mouth daily Takes 1/2 of 5mg tab= 2.5mg daily       Pyridoxine HCl (VITAMIN B-6) 100 MG tablet Take 100 mg by mouth daily. No current facility-administered medications on file prior to encounter. REVIEW OF SYSTEMS    Review of Systems   Unable to perform ROS: Psychiatric disorder         Objective:      BP (!) 171/101   Pulse 69   Temp 97.4 °F (36.3 °C) (Tympanic)   Resp 16   Ht 5' 10\" (1.778 m)   Wt (!) 307 lb (139.3 kg)   BMI 44.05 kg/m²     Wt Readings from Last 3 Encounters:   09/03/21 (!) 307 lb (139.3 kg)   08/26/21 (!) 307 lb (139.3 kg)   01/27/21 285 lb (129.3 kg)       Physical Exam:  General:  Alert and oriented x3. In no acute distress. Lower Extremity Physical Exam:    Vascular: DP pulses are palpable, Bilateral. PT pulses are palpable, Bilateral. CFT <3 seconds to all digits, Bilateral.  Mild nonpitting edema right ankle. Hair growth is present to the level of the digits, Bilateral.     Neuro: Unable to accurately assess    Musculoskeletal: EHL/FHL/GS/TA gross motor intact. Gross deformity is absent. Patient does not react to tenderness to palpation about the right ankle. Dermatologic: Open wound is absent. Some eschar at the site of previous ulceration that once removed with gauze reveals no underlying ulceration.   There is no erythema. There is no drainage. There is no fluctuance or crepitus. Interdigital maceration absent, Bilateral.     Wound 09/03/21 Pretibial Right #1 (Active)   Wound Image   09/03/21 0921   Wound Etiology Traumatic 09/03/21 0921   Dressing Status New drainage noted; Old drainage noted 09/03/21 0921   Wound Cleansed Irrigated with saline 09/03/21 0921   Wound Length (cm) 3.5 cm 09/03/21 0921   Wound Width (cm) 1.5 cm 09/03/21 0921   Wound Depth (cm) 0.1 cm 09/03/21 0921   Wound Surface Area (cm^2) 5.25 cm^2 09/03/21 0921   Wound Volume (cm^3) 0.525 cm^3 09/03/21 0921   Post-Procedure Length (cm) 0 cm 09/03/21 0921   Post-Procedure Width (cm) 0 cm 09/03/21 0921   Post-Procedure Depth (cm) 0 cm 09/03/21 0921   Post-Procedure Surface Area (cm^2) 0 cm^2 09/03/21 0921   Post-Procedure Volume (cm^3) 0 cm^3 09/03/21 0921   Wound Assessment Fibrin;Pink/red 09/03/21 0921   Drainage Amount Moderate 09/03/21 0921   Drainage Description Serosanguinous 09/03/21 0921   Odor None 09/03/21 0921   Vania-wound Assessment Intact; Blanchable erythema 09/03/21 0921   Margins Defined edges 09/03/21 0921   Number of days: 0          XR Tib Fib:  Negative for acute process      Assessment:      Active Hospital Problems    Diagnosis Date Noted    Localized edema [R60.0] 04/43/5929    Trauma complication, late effect [T79. 9XXS] 09/03/2021       Plan:     Treatment Note please see attached Discharge Instructions    Foam to site of healed wound    Discussed Unna boot with mother for control of edema and to allow skin to further heal over the next week. They are amenable to this plan. Elevate leg is much as possible at home. Instructed to call if there are any problems with the Unna boot or if it gets wet    RTC 1 week       Written patient discharge instructions given to patient and signed by patient or POA. No orders of the defined types were placed in this encounter.     No orders of the defined types were placed in this encounter. Discharge Instructions         [address] [place] [address] [facility]      Visit  Discharge Instructions / Physician Orders  DATE: 9/3/2021     Home Care:     SUPPLIES ORDERED THRU:     Wound Location: Right Pretib     Cleanse with: Keep Dry and Intact     Dressing Orders:  Mepilex Foam, Zinc Unna Boot     Frequency:  Keep Dry and Intact     Additional Orders: Increase protein to diet (meat, cheese, eggs, fish, peanut butter, nuts and beans)  Multivitamin daily    MY CHART []     Smart Device  []     HYPERBARIC TREATMENT-                TREATMENT #     Your next appointment with the [address] [place] [address] is in 1 week                                                                                                   ROOM TYPE   [] CHAIR     [] BED   [] EITHER        TIME [] 45 MIN     [] 60 MIN     (Please note your next appointment above and if you are unable to keep, kindly give a 24 hour notice. Thank you.)     If you experience any of the following, please call the [address] [place] [address] during business hours:  [phone]     * Increase in Pain  * Temperature over 101  * Increase in drainage from your wound  * Drainage with a foul odor  * Bleeding  * Increase in swelling  * Need for compression bandage changes due to slippage, breakthrough drainage. If you need medical attention outside of the business hours of the [address] [place] [address] please contact your PCP or go to the nearest emergency room. The information contained in the After Visit Summary has been reviewed with me, the patient and/or responsible adult, by my health care provider(s). I had the opportunity to ask questions regarding this information.  I have elected to receive;      []After Visit Summary  [x]Comprehensive Discharge Instruction      Patient signature______________________________________Date:________  Electronically signed by Daquan Juarez RN on 9/3/2021 at 9:35 AM  Electronically signed by Cranston Najjar, DPM on 9/3/2021 at 9:01 AM          Electronically signed by Luciano Jonas DPM on 9/3/2021 at 9:49 AM

## 2021-09-08 ENCOUNTER — TELEPHONE (OUTPATIENT)
Dept: WOUND CARE | Age: 26
End: 2021-09-08

## 2021-09-08 NOTE — TELEPHONE ENCOUNTER
Patient's mother calling to cancel his [facility] wound care appointment today, stating they were having car trouble, will call back to see if they can get here later in the week.

## 2021-09-15 ENCOUNTER — HOSPITAL ENCOUNTER (OUTPATIENT)
Dept: WOUND CARE | Age: 26
Discharge: HOME | End: 2021-09-15
Payer: COMMERCIAL

## 2021-09-29 ENCOUNTER — HOSPITAL ENCOUNTER (OUTPATIENT)
Dept: WOUND CARE | Age: 26
Discharge: HOME | End: 2021-09-29
Payer: COMMERCIAL

## 2021-09-29 VITALS
SYSTOLIC BLOOD PRESSURE: 138 MMHG | HEART RATE: 102 BPM | BODY MASS INDEX: 44.05 KG/M2 | TEMPERATURE: 97.5 F | RESPIRATION RATE: 16 BRPM | DIASTOLIC BLOOD PRESSURE: 96 MMHG | WEIGHT: 307 LBS

## 2021-09-29 DIAGNOSIS — L97.912 NON-PRESSURE CHRONIC ULCER OF RIGHT LOWER LEG WITH FAT LAYER EXPOSED (HCC): ICD-10-CM

## 2021-09-29 PROCEDURE — 99213 OFFICE O/P EST LOW 20 MIN: CPT

## 2021-09-29 RX ORDER — LIDOCAINE HYDROCHLORIDE 20 MG/ML
JELLY TOPICAL ONCE
Status: CANCELLED | OUTPATIENT
Start: 2021-09-29 | End: 2021-09-29

## 2021-09-29 RX ORDER — LIDOCAINE 40 MG/G
CREAM TOPICAL ONCE
Status: CANCELLED | OUTPATIENT
Start: 2021-09-29 | End: 2021-09-29

## 2021-09-29 RX ORDER — BETAMETHASONE DIPROPIONATE 0.05 %
OINTMENT (GRAM) TOPICAL ONCE
Status: CANCELLED | OUTPATIENT
Start: 2021-09-29 | End: 2021-09-29

## 2021-09-29 RX ORDER — BACITRACIN, NEOMYCIN, POLYMYXIN B 400; 3.5; 5 [USP'U]/G; MG/G; [USP'U]/G
OINTMENT TOPICAL ONCE
Status: CANCELLED | OUTPATIENT
Start: 2021-09-29 | End: 2021-09-29

## 2021-09-29 RX ORDER — GENTAMICIN SULFATE 1 MG/G
OINTMENT TOPICAL ONCE
Status: CANCELLED | OUTPATIENT
Start: 2021-09-29 | End: 2021-09-29

## 2021-09-29 RX ORDER — LIDOCAINE 50 MG/G
OINTMENT TOPICAL ONCE
Status: CANCELLED | OUTPATIENT
Start: 2021-09-29 | End: 2021-09-29

## 2021-09-29 RX ORDER — GINSENG 100 MG
CAPSULE ORAL ONCE
Status: CANCELLED | OUTPATIENT
Start: 2021-09-29 | End: 2021-09-29

## 2021-09-29 RX ORDER — CLOBETASOL PROPIONATE 0.5 MG/G
OINTMENT TOPICAL ONCE
Status: CANCELLED | OUTPATIENT
Start: 2021-09-29 | End: 2021-09-29

## 2021-09-29 RX ORDER — LIDOCAINE HYDROCHLORIDE 40 MG/ML
SOLUTION TOPICAL ONCE
Status: CANCELLED | OUTPATIENT
Start: 2021-09-29 | End: 2021-09-29

## 2021-09-29 RX ORDER — BACITRACIN ZINC AND POLYMYXIN B SULFATE 500; 1000 [USP'U]/G; [USP'U]/G
OINTMENT TOPICAL ONCE
Status: CANCELLED | OUTPATIENT
Start: 2021-09-29 | End: 2021-09-29

## 2021-09-29 NOTE — PROGRESS NOTES
[address]   Progress Note and Procedure Note      [address] [facility] RECORD NUMBER:  [id number]  AGE: 32 y.o. GENDER: male  : 1995  EPISODE DATE:  2021    Subjective:     Chief Complaint   Patient presents with    Wound Check     R leg         HISTORY of PRESENT ILLNESS HPI     Piter Sahu is a 32 y.o. male who presents today for wound/ulcer evaluation. Interval history: Patient presents for follow-up of right leg swelling and wound. States that a small part of the end of the wound has reopened. They will be going out of town this weekend and mother is concerned that he may be getting into a pool. Reports that the swelling has gone down and is nearly resolved. History of Wound Context: Patient presents with his mother for evaluation of right lower leg wound. She states that he fell onto concrete in July and the wound has been open since. He is currently on doxycycline per his PCP. X-ray was obtained of the right tib-fib which was negative for fracture. Patient's mother relates that he continues to have edema in the right ankle. States that he does not limp at home or complain of any pain.   Patient suffers from autism and is nonverbal.    Ulcer Identification:  Ulcer Type: traumatic  Contributing Factors: edema          PAST MEDICAL HISTORY        Diagnosis Date    Autism     Mental developmental delay 2012    Seizure disorder ([facility] [address] 2012    Seizures ([facility] [address]        PAST SURGICAL HISTORY    Past Surgical History:   Procedure Laterality Date    TOE SURGERY         FAMILY HISTORY    Family History   Problem Relation Age of Onset    High Blood Pressure Mother     Diabetes Mother     High Cholesterol Mother     High Blood Pressure Father     Heart Disease Maternal Grandmother     Heart Disease Maternal Grandfather        SOCIAL HISTORY    Social History     Tobacco Use    Smoking status: Never Smoker    Smokeless tobacco: Never Used   Vaping Use    Vaping Use: Never used   Substance Use Topics    Alcohol use: No    Drug use: No       ALLERGIES    Allergies   Allergen Reactions    Azithromycin     Bee Venom        MEDICATIONS    Current Outpatient Medications on File Prior to Encounter   Medication Sig Dispense Refill    doxycycline monohydrate (ADOXA) 100 MG tablet Take 1 tablet by mouth 2 times daily With food 20 tablet 0    hydroCHLOROthiazide (HYDRODIURIL) 12.5 MG tablet TAKE (1) TABLET BY MOUTH ONCE DAILY 30 tablet 11    atorvastatin (LIPITOR) 20 MG tablet TAKE 1 TABLET BY MOUTH ONE TIME A DAY 90 tablet 3    risperiDONE (RISPERDAL) 1 MG tablet Take 1 tablet by mouth nightly      Benzoyl Peroxide (BENZOYL PEROXIDE) 10 % external wash APPLY TOPICALLY TWICE DAILY 227 g 0    levETIRAcetam (KEPPRA XR) 500 MG TB24 extended release tablet 500 mg 5 times daily   3    OXTELLAR  MG TB24 Take 600 mg by mouth 4 times daily   3    Melatonin 5 MG TABS tablet Take 1.5 mg by mouth daily Takes 1/2 of 5mg tab= 2.5mg daily       Pyridoxine HCl (VITAMIN B-6) 100 MG tablet Take 100 mg by mouth daily. No current facility-administered medications on file prior to encounter. REVIEW OF SYSTEMS    Review of Systems   Unable to perform ROS: Psychiatric disorder         Objective:      BP (!) 138/96   Pulse 102   Temp 97.5 °F (36.4 °C) (Tympanic)   Resp 16   Wt (!) 307 lb (139.3 kg)   BMI 44.05 kg/m²     Wt Readings from Last 3 Encounters:   09/29/21 (!) 307 lb (139.3 kg)   09/03/21 (!) 307 lb (139.3 kg)   08/26/21 (!) 307 lb (139.3 kg)       Physical Exam:  General:  Alert and oriented x3. In no acute distress.      Lower Extremity Physical Exam:    Vascular: DP pulses are palpable, Bilateral. PT pulses are palpable, Bilateral. CFT <3 seconds to all digits, Bilateral.  No edema, Bilateral.  Hair growth is present to the level of the digits, Bilateral.     Neuro: Unable to accurately assess    Musculoskeletal: EHL/FHL/GS/TA gross motor intact. Gross deformity is absent. Dermatologic: Open wound is present to right pre-tib limited to the subcutaneous layer. Base is granular. Negative probe to bone. There is no erythema. There is no drainage. There is no fluctuance or crepitus. Interdigital maceration absent, Bilateral.     Wound 09/03/21 Pretibial Right #1 (Active)   Wound Image   09/29/21 1304   Wound Etiology Traumatic 09/29/21 1304   Dressing Status New drainage noted; Old drainage noted 09/29/21 1304   Wound Cleansed Irrigated with saline 09/29/21 1304   Wound Length (cm) 0.5 cm 09/29/21 1304   Wound Width (cm) 0.3 cm 09/29/21 1304   Wound Depth (cm) 0.1 cm 09/29/21 1304   Wound Surface Area (cm^2) 0.15 cm^2 09/29/21 1304   Change in Wound Size % (l*w) 97.14 09/29/21 1304   Wound Volume (cm^3) 0.015 cm^3 09/29/21 1304   Wound Healing % 97 09/29/21 1304   Post-Procedure Length (cm) 0.5 cm 09/29/21 1304   Post-Procedure Width (cm) 0.3 cm 09/29/21 1304   Post-Procedure Depth (cm) 0.1 cm 09/29/21 1304   Post-Procedure Surface Area (cm^2) 0.15 cm^2 09/29/21 1304   Post-Procedure Volume (cm^3) 0.015 cm^3 09/29/21 1304   Wound Assessment Pink/red 09/29/21 1304   Drainage Amount Moderate 09/29/21 1304   Drainage Description Serosanguinous 09/29/21 1304   Odor None 09/29/21 1304   Vania-wound Assessment Intact; Hyperpigmented 09/29/21 1304   Margins Defined edges 09/29/21 1304   Wound Thickness Description not for Pressure Injury Full thickness 09/29/21 1304   Number of days: 26          XR Tib Fib:  Negative for acute process      Assessment:      Active Hospital Problems    Diagnosis Date Noted    Non-pressure chronic ulcer of right lower leg with fat layer exposed ([facility] [address] [L97.912] 09/29/2021    Localized edema [R60.0] 50/20/6657    Trauma complication, late effect [T79. 9XXS] 09/03/2021    Morbidly obese ([facility] [address] [E66.01] 08/05/2020       Plan:     Treatment Note please see attached Discharge Instructions    Recommend Unna boot with endoform and Hydrofera Blue to the wound base. They are worried about him getting into the pool and as such we will use a daily dressing until next week. Recommended not getting into the pool to avoid infection. Fibracol and gentac silicone border gauze daily to the wound base  Stockinette and ACE from the toes to the knee    RTC 1 week       Written patient discharge instructions given to patient and signed by patient or POA. No orders of the defined types were placed in this encounter. No orders of the defined types were placed in this encounter. Discharge Instructions         [address] and [address] [facility]                                 Visit  Discharge Instructions / Physician Orders  DATE: 9/29/2021     Home Care:     SUPPLIES ORDERED THRU:     Wound Location: Right Pretib     Cleanse with: Keep Dry and Intact     Dressing Orders:  Debria Fadi  Then gentac silicone border or bandaide then apply tubi  then wrap ace wrap     Frequency:  Keep Dry and Intact     Additional Orders: Increase protein to diet (meat, cheese, eggs, fish, peanut butter, nuts and beans)  Multivitamin daily     MY CHART []?     Smart Device  []?      HYPERBARIC TREATMENT-                TREATMENT #     Your next appointment with the [address] [place] [address] is in 1 week                                                                                                   ROOM TYPE   []? CHAIR     []? BED   []? EITHER        TIME []? 45 MIN     []? 60 MIN     (Please note your next appointment above and if you are unable to keep, kindly give a 24 hour notice.  Thank you.)     If you experience any of the following, please call the [address] [place] [address] during business hours:  [phone]     * Increase in Pain  * Temperature over 101  * Increase in drainage from your wound  * Drainage with a foul odor  * Bleeding  * Increase in swelling  * Need for compression bandage changes due to slippage, breakthrough drainage.     If you need medical attention outside of the business hours of the [address] [place] [address] Road please contact your PCP or go to the nearest emergency room. The information contained in the After Visit Summary has been reviewed with me, the patient and/or responsible adult, by my health care provider(s). I had the opportunity to ask questions regarding this information. I have elected to receive;      []? After Visit Summary  [x]? Comprehensive Discharge Instruction        Patient signature______________________________________Date:________  Electronically signed by Sahara Quinn DPM on 9/29/2021 at 12:48 PM  Electronically signed by Brigida Trevino RN on 9/29/2021 at 1:35 PM          Electronically signed by Sahara Quinn DPM on 9/29/2021 at 1:37 PM

## 2021-10-06 ENCOUNTER — HOSPITAL ENCOUNTER (OUTPATIENT)
Dept: WOUND CARE | Age: 26
Discharge: HOME | End: 2021-10-06
Payer: COMMERCIAL

## 2021-10-06 VITALS
WEIGHT: 307 LBS | TEMPERATURE: 96.9 F | HEART RATE: 84 BPM | HEIGHT: 70 IN | BODY MASS INDEX: 43.95 KG/M2 | RESPIRATION RATE: 18 BRPM | SYSTOLIC BLOOD PRESSURE: 140 MMHG | DIASTOLIC BLOOD PRESSURE: 92 MMHG

## 2021-10-06 DIAGNOSIS — L97.912 NON-PRESSURE CHRONIC ULCER OF RIGHT LOWER LEG WITH FAT LAYER EXPOSED (HCC): Primary | ICD-10-CM

## 2021-10-06 PROCEDURE — 29580 STRAPPING UNNA BOOT: CPT

## 2021-10-06 RX ORDER — LIDOCAINE HYDROCHLORIDE 20 MG/ML
JELLY TOPICAL ONCE
Status: CANCELLED | OUTPATIENT
Start: 2021-10-06 | End: 2021-10-06

## 2021-10-06 RX ORDER — GINSENG 100 MG
CAPSULE ORAL ONCE
Status: CANCELLED | OUTPATIENT
Start: 2021-10-06 | End: 2021-10-06

## 2021-10-06 RX ORDER — LIDOCAINE 50 MG/G
OINTMENT TOPICAL ONCE
Status: CANCELLED | OUTPATIENT
Start: 2021-10-06 | End: 2021-10-06

## 2021-10-06 RX ORDER — LIDOCAINE HYDROCHLORIDE 40 MG/ML
SOLUTION TOPICAL ONCE
Status: CANCELLED | OUTPATIENT
Start: 2021-10-06 | End: 2021-10-06

## 2021-10-06 RX ORDER — BACITRACIN ZINC AND POLYMYXIN B SULFATE 500; 1000 [USP'U]/G; [USP'U]/G
OINTMENT TOPICAL ONCE
Status: CANCELLED | OUTPATIENT
Start: 2021-10-06 | End: 2021-10-06

## 2021-10-06 RX ORDER — LIDOCAINE HYDROCHLORIDE 40 MG/ML
SOLUTION TOPICAL ONCE
Status: COMPLETED | OUTPATIENT
Start: 2021-10-06 | End: 2021-10-06

## 2021-10-06 RX ORDER — LIDOCAINE 40 MG/G
CREAM TOPICAL ONCE
Status: CANCELLED | OUTPATIENT
Start: 2021-10-06 | End: 2021-10-06

## 2021-10-06 RX ORDER — GENTAMICIN SULFATE 1 MG/G
OINTMENT TOPICAL ONCE
Status: CANCELLED | OUTPATIENT
Start: 2021-10-06 | End: 2021-10-06

## 2021-10-06 RX ORDER — BETAMETHASONE DIPROPIONATE 0.05 %
OINTMENT (GRAM) TOPICAL ONCE
Status: CANCELLED | OUTPATIENT
Start: 2021-10-06 | End: 2021-10-06

## 2021-10-06 RX ORDER — CLOBETASOL PROPIONATE 0.5 MG/G
OINTMENT TOPICAL ONCE
Status: CANCELLED | OUTPATIENT
Start: 2021-10-06 | End: 2021-10-06

## 2021-10-06 RX ORDER — BACITRACIN, NEOMYCIN, POLYMYXIN B 400; 3.5; 5 [USP'U]/G; MG/G; [USP'U]/G
OINTMENT TOPICAL ONCE
Status: CANCELLED | OUTPATIENT
Start: 2021-10-06 | End: 2021-10-06

## 2021-10-06 RX ADMIN — LIDOCAINE HYDROCHLORIDE: 40 SOLUTION TOPICAL at 13:28

## 2021-10-06 ASSESSMENT — PAIN DESCRIPTION - DESCRIPTORS: DESCRIPTORS: ACHING

## 2021-10-06 ASSESSMENT — PAIN DESCRIPTION - ORIENTATION: ORIENTATION: RIGHT;LOWER

## 2021-10-06 ASSESSMENT — PAIN DESCRIPTION - PAIN TYPE: TYPE: CHRONIC PAIN

## 2021-10-06 ASSESSMENT — PAIN DESCRIPTION - LOCATION: LOCATION: LEG

## 2021-10-06 ASSESSMENT — PAIN SCALES - WONG BAKER: WONGBAKER_NUMERICALRESPONSE: 2

## 2021-10-06 NOTE — PROGRESS NOTES
[address]   Progress Note and Procedure Note      [address] [facility] RECORD NUMBER:  [id number]  AGE: 32 y.o. GENDER: male  : 1995  EPISODE DATE:  10/6/2021    Subjective:     Chief Complaint   Patient presents with    Wound Check     right lwer leg         HISTORY of PRESENT ILLNESS HPI     Irina Aleman is a 32 y.o. male who presents today for wound/ulcer evaluation. Current evaluation: Patient has another small opening at the dorsal aspect of the old wound. No increased drainage or sign of infection. Interval history: Patient presents for follow-up of right leg swelling and wound. States that a small part of the end of the wound has reopened. They will be going out of town this weekend and mother is concerned that he may be getting into a pool. Reports that the swelling has gone down and is nearly resolved. History of Wound Context: Patient presents with his mother for evaluation of right lower leg wound. She states that he fell onto concrete in July and the wound has been open since. He is currently on doxycycline per his PCP. X-ray was obtained of the right tib-fib which was negative for fracture. Patient's mother relates that he continues to have edema in the right ankle. States that he does not limp at home or complain of any pain.   Patient suffers from autism and is nonverbal.    Ulcer Identification:  Ulcer Type: traumatic  Contributing Factors: edema          PAST MEDICAL HISTORY        Diagnosis Date    Autism     Mental developmental delay 2012    Seizure disorder ([facility] [address] 2012    Seizures ([facility] [address]        PAST SURGICAL HISTORY    Past Surgical History:   Procedure Laterality Date    TOE SURGERY         FAMILY HISTORY    Family History   Problem Relation Age of Onset    High Blood Pressure Mother     Diabetes Mother     High Cholesterol Mother     High Blood Pressure Father     Heart Disease Maternal Grandmother     Heart Disease Maternal Grandfather        SOCIAL HISTORY    Social History     Tobacco Use    Smoking status: Never Smoker    Smokeless tobacco: Never Used   Vaping Use    Vaping Use: Never used   Substance Use Topics    Alcohol use: No    Drug use: No       ALLERGIES    Allergies   Allergen Reactions    Azithromycin     Bee Venom        MEDICATIONS    Current Outpatient Medications on File Prior to Encounter   Medication Sig Dispense Refill    doxycycline monohydrate (ADOXA) 100 MG tablet Take 1 tablet by mouth 2 times daily With food 20 tablet 0    hydroCHLOROthiazide (HYDRODIURIL) 12.5 MG tablet TAKE (1) TABLET BY MOUTH ONCE DAILY 30 tablet 11    atorvastatin (LIPITOR) 20 MG tablet TAKE 1 TABLET BY MOUTH ONE TIME A DAY 90 tablet 3    risperiDONE (RISPERDAL) 1 MG tablet Take 1 tablet by mouth nightly      Benzoyl Peroxide (BENZOYL PEROXIDE) 10 % external wash APPLY TOPICALLY TWICE DAILY 227 g 0    levETIRAcetam (KEPPRA XR) 500 MG TB24 extended release tablet 500 mg 5 times daily   3    OXTELLAR  MG TB24 Take 600 mg by mouth 4 times daily   3    Melatonin 5 MG TABS tablet Take 1.5 mg by mouth daily Takes 1/2 of 5mg tab= 2.5mg daily       Pyridoxine HCl (VITAMIN B-6) 100 MG tablet Take 100 mg by mouth daily. No current facility-administered medications on file prior to encounter. REVIEW OF SYSTEMS    Review of Systems   Unable to perform ROS: Psychiatric disorder         Objective:      BP (!) 140/92   Pulse 84   Temp 96.9 °F (36.1 °C) (Tympanic)   Resp 18   Ht 5' 10\" (1.778 m)   Wt (!) 307 lb (139.3 kg)   BMI 44.05 kg/m²     Wt Readings from Last 3 Encounters:   10/06/21 (!) 307 lb (139.3 kg)   09/29/21 (!) 307 lb (139.3 kg)   09/03/21 (!) 307 lb (139.3 kg)       Physical Exam:  General:  Alert and oriented x3. In no acute distress.      Lower Extremity Physical Exam:    Vascular: DP pulses are palpable, Bilateral. PT pulses are palpable, Bilateral. CFT <3 seconds to all digits, Bilateral.  No edema, Bilateral.  Hair growth is present to the level of the digits, Bilateral.     Neuro: Unable to accurately assess    Musculoskeletal: EHL/FHL/GS/TA gross motor intact. Gross deformity is absent. Dermatologic: Open wound is present to right pre-tib limited to the subcutaneous layer. Base is granular. Negative probe to bone. There is no erythema. There is no drainage. There is no fluctuance or crepitus. Interdigital maceration absent, Bilateral.     Wound 09/03/21 Pretibial Right #1 (Active)   Wound Image   09/29/21 1304   Wound Etiology Traumatic 10/06/21 1325   Dressing Status New drainage noted; Old drainage noted 10/06/21 1325   Wound Cleansed Irrigated with saline 10/06/21 1325   Wound Length (cm) 0.5 cm 10/06/21 1325   Wound Width (cm) 0.3 cm 10/06/21 1325   Wound Depth (cm) 0.1 cm 10/06/21 1325   Wound Surface Area (cm^2) 0.15 cm^2 10/06/21 1325   Change in Wound Size % (l*w) 97.14 10/06/21 1325   Wound Volume (cm^3) 0.015 cm^3 10/06/21 1325   Wound Healing % 97 10/06/21 1325   Post-Procedure Length (cm) 0.5 cm 10/06/21 1325   Post-Procedure Width (cm) 0.3 cm 10/06/21 1325   Post-Procedure Depth (cm) 0.1 cm 10/06/21 1325   Post-Procedure Surface Area (cm^2) 0.15 cm^2 10/06/21 1325   Post-Procedure Volume (cm^3) 0.015 cm^3 10/06/21 1325   Wound Assessment Pink/red 10/06/21 1325   Drainage Amount Moderate 10/06/21 1325   Drainage Description Serosanguinous; Yellow 10/06/21 1325   Odor None 10/06/21 1325   Vania-wound Assessment Intact; Hyperpigmented 10/06/21 1325   Margins Defined edges 10/06/21 1325   Wound Thickness Description not for Pressure Injury Full thickness 09/29/21 1304   Number of days: 33          XR Tib Fib:  Negative for acute process      Assessment:      Active Hospital Problems    Diagnosis Date Noted    Non-pressure chronic ulcer of right lower leg with fat layer exposed ([facility] [address] [L97.912] 09/29/2021    Localized edema [R60.0] 29/99/0270    Trauma complication, late effect [T79. 9XXS] 09/03/2021    Morbidly obese ([facility] [address] [E66.01] 08/05/2020       Plan:     Treatment Note please see attached Discharge Instructions    No debridement indicated    XR tib fib reviewed, no sign of foreign body    Recommend Unna boot for control of edema with endoform and Hydrofera Blue to the wound base. RTC 1 week       Written patient discharge instructions given to patient and signed by patient or POA.       Orders Placed This Encounter   Medications    lidocaine (XYLOCAINE) 4 % external solution     Orders Placed This Encounter   Procedures    Initiate Outpatient Wound Care Protocol     Cleanse wound with saline    If wound contains bioburden or contamination cleanse with wound cleanser or antimicrobial solution     For normal periwound tissue without irritation nor maceration, apply topical skin protectant    For periwound tissue with irritation and/or maceration, apply zinc based product, topical steroid cream/ointment, or equivalent     For wounds with dry firm black eschar and/or without exudate, apply betadine and leave open to air      For wounds with scant/small to no exudate or drainage, apply wound gel, hydrocolloid, polymer, or equivalent and cover with secondary dressing/foam      For wounds with moderate/large exudate or drainage, apply alginate, hydrofiber, polymer, or equivalent and cover with secondary dressing/foam    For wounds with nonviable tissue requiring removal, apply chemical or mechanical debrider and cover with secondary dressing/foam    For wounds with tunneling, dead space, or cavity, fill or pack with strip/gauze/kerlex to fit and cover with secondary dressing/foam    For wounds with adequate granulation or epithelization, apply wound gel, hydrocolloid, polymer, collagen, or transparent film, and cover secondary dry dressing/foam    For wounds that need additional secondary dressing to help pad or control additional drainage/exudates, add foam, absorbent pad or hydrocolloid    For wounds with suspected or known infection, apply antimicrobial mesh and/or antimicrobial alginate/hydrofiber, or antimicrobial solution moistened gauze/kerlex, or equivalent and cover with secondary dressing/foam    Compression Management needed for edema control, apply multilayer compression or tubular garment or equivalent    Offloading Management needed for pressure relief, apply offloading shoe/boot or equivalent     Standing Status:   Standing     Number of Occurrences:   1          Discharge Instructions         [address] [facility] [address] and [address]  Visit  Discharge Instructions / Physician Orders  DATE: 10/6/2021     Home Care:NONE     SUPPLIES ORDERED THRU:NONE     Wound Location: Right Pretib     Cleanse with: Keep Dry and Intact     Dressing Orders:  Endoform / hydroferra blue ready transfer unna boot   Frequency:  Keep Dry and Intact     Additional Orders: Increase protein to diet (meat, cheese, eggs, fish, peanut butter, nuts and beans)  Multivitamin daily     MY CHART []? ?     Smart Device  []? ?      HYPERBARIC TREATMENT-                DQLALCCEI #     Your next appointment with the [address] [place] [address] is in 1 week                                                                                                   ROOM TYPE   []? ? CHAIR     []? ? BED   []? ? EITHER        TIME []?? 45 MIN     []? ? 60 MIN     (Please note your next appointment above and if you are unable to keep, kindly give a 24 hour notice.  Thank you.)     If you experience any of the following, please call the [address] [place] [address] during business hours:  [phone]     * Increase in Pain  * Temperature over 101  * Increase in drainage from your wound  * Drainage with a foul odor  * Bleeding  * Increase in swelling  * Need for compression bandage changes due to slippage, breakthrough drainage.     If you need medical attention outside of the business hours of the Wound Care Centers please contact your PCP or go to the nearest emergency room.     The information contained in the After Visit Summary has been reviewed with me, the patient and/or responsible adult, by my health care provider(s). I had the opportunity to ask questions regarding this information. I have elected to receive;      []? ? After Visit Summary  [x]? ? Comprehensive Discharge Instruction        Patient signature______________________________________  Electronically signed by Teddy Montero DPM on 10/6/2021 at 1:12 PM  Electronically signed by Renato Betancourt RN on 10/6/2021 at 1:42 PM          Electronically signed by Teddy Montero DPM on 10/6/2021 at 1:45 PM

## 2021-10-13 ENCOUNTER — HOSPITAL ENCOUNTER (OUTPATIENT)
Dept: WOUND CARE | Age: 26
Discharge: HOME | End: 2021-10-13
Payer: COMMERCIAL

## 2021-10-13 VITALS
DIASTOLIC BLOOD PRESSURE: 106 MMHG | RESPIRATION RATE: 18 BRPM | BODY MASS INDEX: 43.95 KG/M2 | HEIGHT: 70 IN | HEART RATE: 104 BPM | TEMPERATURE: 97.8 F | WEIGHT: 307 LBS | SYSTOLIC BLOOD PRESSURE: 176 MMHG

## 2021-10-13 DIAGNOSIS — L97.912 NON-PRESSURE CHRONIC ULCER OF RIGHT LOWER LEG WITH FAT LAYER EXPOSED (HCC): Primary | ICD-10-CM

## 2021-10-13 PROBLEM — L97.911 NON-PRESSURE CHRONIC ULCER OF RIGHT LOWER LEG, LIMITED TO BREAKDOWN OF SKIN (HCC): Status: ACTIVE | Noted: 2021-09-29

## 2021-10-13 PROCEDURE — 29580 STRAPPING UNNA BOOT: CPT

## 2021-10-13 RX ORDER — CLOBETASOL PROPIONATE 0.5 MG/G
OINTMENT TOPICAL ONCE
OUTPATIENT
Start: 2021-10-13 | End: 2021-10-13

## 2021-10-13 RX ORDER — BACITRACIN, NEOMYCIN, POLYMYXIN B 400; 3.5; 5 [USP'U]/G; MG/G; [USP'U]/G
OINTMENT TOPICAL ONCE
OUTPATIENT
Start: 2021-10-13 | End: 2021-10-13

## 2021-10-13 RX ORDER — GINSENG 100 MG
CAPSULE ORAL ONCE
OUTPATIENT
Start: 2021-10-13 | End: 2021-10-13

## 2021-10-13 RX ORDER — LIDOCAINE HYDROCHLORIDE 40 MG/ML
SOLUTION TOPICAL ONCE
Status: CANCELLED | OUTPATIENT
Start: 2021-10-13 | End: 2021-10-13

## 2021-10-13 RX ORDER — LIDOCAINE HYDROCHLORIDE 20 MG/ML
JELLY TOPICAL ONCE
OUTPATIENT
Start: 2021-10-13 | End: 2021-10-13

## 2021-10-13 RX ORDER — LIDOCAINE HYDROCHLORIDE 40 MG/ML
SOLUTION TOPICAL ONCE
Status: COMPLETED | OUTPATIENT
Start: 2021-10-13 | End: 2021-10-13

## 2021-10-13 RX ORDER — GENTAMICIN SULFATE 1 MG/G
OINTMENT TOPICAL ONCE
OUTPATIENT
Start: 2021-10-13 | End: 2021-10-13

## 2021-10-13 RX ORDER — BETAMETHASONE DIPROPIONATE 0.05 %
OINTMENT (GRAM) TOPICAL ONCE
OUTPATIENT
Start: 2021-10-13 | End: 2021-10-13

## 2021-10-13 RX ORDER — LIDOCAINE 50 MG/G
OINTMENT TOPICAL ONCE
OUTPATIENT
Start: 2021-10-13 | End: 2021-10-13

## 2021-10-13 RX ORDER — BACITRACIN ZINC AND POLYMYXIN B SULFATE 500; 1000 [USP'U]/G; [USP'U]/G
OINTMENT TOPICAL ONCE
OUTPATIENT
Start: 2021-10-13 | End: 2021-10-13

## 2021-10-13 RX ORDER — LIDOCAINE 40 MG/G
CREAM TOPICAL ONCE
OUTPATIENT
Start: 2021-10-13 | End: 2021-10-13

## 2021-10-13 RX ADMIN — LIDOCAINE HYDROCHLORIDE 5 ML: 40 SOLUTION TOPICAL at 13:20

## 2021-10-13 ASSESSMENT — PAIN SCALES - GENERAL: PAINLEVEL_OUTOF10: 0

## 2021-10-13 NOTE — PROGRESS NOTES
[employer] Application   Below Knee    NAME:  Shaheed Blankenship  YOB: 1995  MEDICAL RECORD NUMBER:  [id number]  DATE:  10/13/2021     [x] Removed old Caryl Billet boot if indicated and wash leg with mild soap and water.  [x] Applied moisturizing agent to dry skin as needed.  [x] Appied primary and secondary dressing as ordered     [x] Applied Unna roll from toes to knee overlapping each time.  [x] Applied ace wrap or coban from toes to below the knee.  [x] Secured with tape and/or metal clips covered with tape.  [x] Instructed patient/caregiver to keep dressing dry and intact. DO NOT REMOVE DRESSING.  [x] Instructed pt/family/caregiver to report excessive draining, loose bandage, wet dressing, severe pain or tingling in toes.  [x] Applied [employer] dressing below the knee to Right lower leg(s)        Unna Boot(s) were applied per  Guidelines.      Electronically signed by Paige Juares RN on 10/13/2021 at 1:59 PM

## 2021-10-13 NOTE — PROGRESS NOTES
[address]   Progress Note and Procedure Note      [address] [facility] RECORD NUMBER:  [id number]  AGE: 32 y.o. GENDER: male  : 1995  EPISODE DATE:  10/13/2021    Subjective:     Chief Complaint   Patient presents with    Wound Check     right lower leg         HISTORY of PRESENT ILLNESS HPI     Irina Aleman is a 32 y.o. male who presents today for wound/ulcer evaluation. Current evaluation:  [facility] Wound has healed. Interval history: Patient presents for follow-up of right leg swelling and wound. States that a small part of the end of the wound has reopened. They will be going out of town this weekend and mother is concerned that he may be getting into a pool. Reports that the swelling has gone down and is nearly resolved. History of Wound Context: Patient presents with his mother for evaluation of right lower leg wound. She states that he fell onto concrete in July and the wound has been open since. He is currently on doxycycline per his PCP. X-ray was obtained of the right tib-fib which was negative for fracture. Patient's mother relates that he continues to have edema in the right ankle. States that he does not limp at home or complain of any pain.   Patient suffers from autism and is nonverbal.    Ulcer Identification:  Ulcer Type: traumatic  Contributing Factors: edema          PAST MEDICAL HISTORY        Diagnosis Date    Autism     Mental developmental delay 2012    Seizure disorder ([facility] [address] 2012    Seizures ([facility] [address]        PAST SURGICAL HISTORY    Past Surgical History:   Procedure Laterality Date    TOE SURGERY         FAMILY HISTORY    Family History   Problem Relation Age of Onset    High Blood Pressure Mother     Diabetes Mother     High Cholesterol Mother     High Blood Pressure Father     Heart Disease Maternal Grandmother     Heart Disease Maternal Grandfather        SOCIAL HISTORY    Social History     Tobacco Use    Smoking status: Never Smoker    Smokeless tobacco: Never Used   Vaping Use    Vaping Use: Never used   Substance Use Topics    Alcohol use: No    Drug use: No       ALLERGIES    Allergies   Allergen Reactions    Azithromycin     Bee Venom        MEDICATIONS    Current Outpatient Medications on File Prior to Encounter   Medication Sig Dispense Refill    doxycycline monohydrate (ADOXA) 100 MG tablet Take 1 tablet by mouth 2 times daily With food 20 tablet 0    hydroCHLOROthiazide (HYDRODIURIL) 12.5 MG tablet TAKE (1) TABLET BY MOUTH ONCE DAILY 30 tablet 11    atorvastatin (LIPITOR) 20 MG tablet TAKE 1 TABLET BY MOUTH ONE TIME A DAY 90 tablet 3    risperiDONE (RISPERDAL) 1 MG tablet Take 1 tablet by mouth nightly      Benzoyl Peroxide (BENZOYL PEROXIDE) 10 % external wash APPLY TOPICALLY TWICE DAILY 227 g 0    levETIRAcetam (KEPPRA XR) 500 MG TB24 extended release tablet 500 mg 5 times daily   3    OXTELLAR  MG TB24 Take 600 mg by mouth 4 times daily   3    Melatonin 5 MG TABS tablet Take 1.5 mg by mouth daily Takes 1/2 of 5mg tab= 2.5mg daily       Pyridoxine HCl (VITAMIN B-6) 100 MG tablet Take 100 mg by mouth daily. No current facility-administered medications on file prior to encounter. REVIEW OF SYSTEMS    Review of Systems   Unable to perform ROS: Psychiatric disorder         Objective:      BP (!) 176/106   Pulse 104   Temp 97.8 °F (36.6 °C) (Tympanic)   Resp 18   Ht 5' 10\" (1.778 m)   Wt (!) 307 lb (139.3 kg)   BMI 44.05 kg/m²     Wt Readings from Last 3 Encounters:   10/13/21 (!) 307 lb (139.3 kg)   10/06/21 (!) 307 lb (139.3 kg)   09/29/21 (!) 307 lb (139.3 kg)       Physical Exam:  General:  Alert and oriented x3. In no acute distress.      Lower Extremity Physical Exam:    Vascular: DP pulses are palpable, Bilateral. PT pulses are palpable, Bilateral. CFT <3 seconds to all digits, Bilateral.  No edema, Bilateral.  Hair growth is present to the level of the digits, Bilateral.     Neuro: Unable to accurately assess    Musculoskeletal: EHL/FHL/GS/TA gross motor intact. Gross deformity is absent. Dermatologic: The more superior ulceration has healed and the more distal wound has covered with dry well adhered eschar. Surrounding skin is atrophic. Negative probe to bone. There is no erythema. There is no drainage. There is no fluctuance or crepitus. No increased calor. Interdigital maceration absent, Bilateral.     Wound 09/03/21 Pretibial Right #1 (Active)   Wound Image   09/29/21 1304   Wound Etiology Traumatic 10/13/21 1312   Dressing Status Old drainage noted 10/13/21 1312   Wound Cleansed Soap and water 10/13/21 1312   Wound Length (cm) 0 cm 10/13/21 1312   Wound Width (cm) 0 cm 10/13/21 1312   Wound Depth (cm) 0 cm 10/13/21 1312   Wound Surface Area (cm^2) 0 cm^2 10/13/21 1312   Change in Wound Size % (l*w) 100 10/13/21 1312   Wound Volume (cm^3) 0 cm^3 10/13/21 1312   Wound Healing % 100 10/13/21 1312   Post-Procedure Length (cm) 0 cm 10/13/21 1312   Post-Procedure Width (cm) 0 cm 10/13/21 1312   Post-Procedure Depth (cm) 0 cm 10/13/21 1312   Post-Procedure Surface Area (cm^2) 0 cm^2 10/13/21 1312   Post-Procedure Volume (cm^3) 0 cm^3 10/13/21 1312   Wound Assessment Pink/red 10/13/21 1312   Drainage Amount Moderate 10/13/21 1312   Drainage Description Serosanguinous; Yellow 10/13/21 1312   Odor None 10/13/21 1312   Vania-wound Assessment Intact; Hyperpigmented 10/13/21 1312   Margins Defined edges 10/13/21 1312   Wound Thickness Description not for Pressure Injury Full thickness 10/13/21 1312   Number of days: 40          XR Tib Fib:  Negative for acute process      Assessment:      Active Hospital Problems    Diagnosis Date Noted    Non-pressure chronic ulcer of right lower leg, limited to breakdown of skin ([facility] [address] [L97.911] 09/29/2021    Localized edema [R60.0] 58/16/0426    Trauma complication, late effect [T79. 9XXS] 09/03/2021    Morbidly obese ([facility] [address] [E66.01] 08/05/2020       Plan:     Treatment Note please see attached Discharge Instructions    No debridement indicated    Recommend Unna boot for control of edema with foam to site of atrophic skin to protect for the next week and allow to heal completely      RTC 1 week       Written patient discharge instructions given to patient and signed by patient or POA.       Orders Placed This Encounter   Medications    lidocaine (XYLOCAINE) 4 % external solution     Orders Placed This Encounter   Procedures    Initiate Outpatient Wound Care Protocol     Cleanse wound with saline    If wound contains bioburden or contamination cleanse with wound cleanser or antimicrobial solution     For normal periwound tissue without irritation nor maceration, apply topical skin protectant    For periwound tissue with irritation and/or maceration, apply zinc based product, topical steroid cream/ointment, or equivalent     For wounds with dry firm black eschar and/or without exudate, apply betadine and leave open to air      For wounds with scant/small to no exudate or drainage, apply wound gel, hydrocolloid, polymer, or equivalent and cover with secondary dressing/foam      For wounds with moderate/large exudate or drainage, apply alginate, hydrofiber, polymer, or equivalent and cover with secondary dressing/foam    For wounds with nonviable tissue requiring removal, apply chemical or mechanical debrider and cover with secondary dressing/foam    For wounds with tunneling, dead space, or cavity, fill or pack with strip/gauze/kerlex to fit and cover with secondary dressing/foam    For wounds with adequate granulation or epithelization, apply wound gel, hydrocolloid, polymer, collagen, or transparent film, and cover secondary dry dressing/foam    For wounds that need additional secondary dressing to help pad or control additional drainage/exudates, add foam, absorbent pad or hydrocolloid    For wounds with suspected or known infection, apply antimicrobial mesh and/or antimicrobial alginate/hydrofiber, or antimicrobial solution moistened gauze/kerlex, or equivalent and cover with secondary dressing/foam    Compression Management needed for edema control, apply multilayer compression or tubular garment or equivalent    Offloading Management needed for pressure relief, apply offloading shoe/boot or equivalent     Standing Status:   Standing     Number of Occurrences:   1          Discharge Instructions         [address] [facility] [address] and [address]  Visit  Discharge Instructions / Physician Orders  DATE: 10/13/2021     Home Care:NONE     SUPPLIES ORDERED [address]     Wound Location: Right Pretib     Cleanse with: Keep Dry and Intact     Dressing Orders:  Foam to healed area for protection then unna boot   Frequency:  Keep Dry and Intact     Additional Orders: Increase protein to diet (meat, cheese, eggs, fish, peanut butter, nuts and beans)  Multivitamin daily   Elevate leg if wrap starts to feel tight  MY CHART []???     Smart Device  []???      HYPERBARIC TREATMENT-                TREATMENT #     Your next appointment with the [address] [place] [address] is in 1 week                                                                                                   ROOM TYPE   []? ?? CHAIR     []? ?? BED   []? ?? EITHER        TIME []??? 45 MIN     []? ?? 60 MIN     (Please note your next appointment above and if you are unable to keep, kindly give a 24 hour notice.  Thank you.)     If you experience any of the following, please call the [address] [place] [address] during business hours:  [phone]     * Increase in Pain  * Temperature over 101  * Increase in drainage from your wound  * Drainage with a foul odor  * Bleeding  * Increase in swelling  * Need for compression bandage changes due to slippage, breakthrough drainage.     If you need medical attention outside of the business hours of the Wound Care Centers please contact your PCP or go to the nearest emergency room.     The information contained in the After Visit Summary has been reviewed with me, the patient and/or responsible adult, by my health care provider(s). I had the opportunity to ask questions regarding this information. I have elected to receive;      []? ? ? After Visit Summary  [x]? ?? Comprehensive Discharge Instruction        Patient signature______________________________________  Electronically signed by Angella Martines DPM on 10/13/2021 at 12:59 PM  Electronically signed by Marissa Hudson RN on 10/13/2021 at 1:49 PM  s        Electronically signed by Angella Martines DPM on 10/13/2021 at 1:50 PM

## 2021-10-20 ENCOUNTER — HOSPITAL ENCOUNTER (OUTPATIENT)
Dept: WOUND CARE | Age: 26
Discharge: HOME | End: 2021-10-20
Payer: COMMERCIAL

## 2021-10-20 VITALS
BODY MASS INDEX: 44.05 KG/M2 | RESPIRATION RATE: 18 BRPM | DIASTOLIC BLOOD PRESSURE: 89 MMHG | WEIGHT: 307 LBS | TEMPERATURE: 97.1 F | SYSTOLIC BLOOD PRESSURE: 129 MMHG | HEART RATE: 83 BPM

## 2021-10-20 DIAGNOSIS — L97.911 NON-PRESSURE CHRONIC ULCER OF RIGHT LOWER LEG, LIMITED TO BREAKDOWN OF SKIN (HCC): Primary | ICD-10-CM

## 2021-10-20 PROCEDURE — 99212 OFFICE O/P EST SF 10 MIN: CPT

## 2021-10-20 RX ORDER — GENTAMICIN SULFATE 1 MG/G
OINTMENT TOPICAL ONCE
OUTPATIENT
Start: 2021-10-20 | End: 2021-10-20

## 2021-10-20 RX ORDER — LIDOCAINE 40 MG/G
CREAM TOPICAL ONCE
OUTPATIENT
Start: 2021-10-20 | End: 2021-10-20

## 2021-10-20 RX ORDER — LIDOCAINE HYDROCHLORIDE 40 MG/ML
SOLUTION TOPICAL ONCE
Status: DISCONTINUED | OUTPATIENT
Start: 2021-10-20 | End: 2021-10-21 | Stop reason: HOSPADM

## 2021-10-20 RX ORDER — LIDOCAINE HYDROCHLORIDE 20 MG/ML
JELLY TOPICAL ONCE
OUTPATIENT
Start: 2021-10-20 | End: 2021-10-20

## 2021-10-20 RX ORDER — CLOBETASOL PROPIONATE 0.5 MG/G
OINTMENT TOPICAL ONCE
OUTPATIENT
Start: 2021-10-20 | End: 2021-10-20

## 2021-10-20 RX ORDER — LIDOCAINE HYDROCHLORIDE 40 MG/ML
SOLUTION TOPICAL ONCE
OUTPATIENT
Start: 2021-10-20 | End: 2021-10-20

## 2021-10-20 RX ORDER — LIDOCAINE 50 MG/G
OINTMENT TOPICAL ONCE
OUTPATIENT
Start: 2021-10-20 | End: 2021-10-20

## 2021-10-20 RX ORDER — BETAMETHASONE DIPROPIONATE 0.05 %
OINTMENT (GRAM) TOPICAL ONCE
OUTPATIENT
Start: 2021-10-20 | End: 2021-10-20

## 2021-10-20 RX ORDER — BACITRACIN, NEOMYCIN, POLYMYXIN B 400; 3.5; 5 [USP'U]/G; MG/G; [USP'U]/G
OINTMENT TOPICAL ONCE
OUTPATIENT
Start: 2021-10-20 | End: 2021-10-20

## 2021-10-20 RX ORDER — GINSENG 100 MG
CAPSULE ORAL ONCE
OUTPATIENT
Start: 2021-10-20 | End: 2021-10-20

## 2021-10-20 RX ORDER — BACITRACIN ZINC AND POLYMYXIN B SULFATE 500; 1000 [USP'U]/G; [USP'U]/G
OINTMENT TOPICAL ONCE
OUTPATIENT
Start: 2021-10-20 | End: 2021-10-20

## 2021-10-20 NOTE — PROGRESS NOTES
[address]   Progress Note and Procedure Note      [address] [facility] RECORD NUMBER:  [id number]  AGE: 32 y.o. GENDER: male  : 1995  EPISODE DATE:  10/20/2021    Subjective:     Chief Complaint   Patient presents with    Wound Check     R leg         HISTORY of PRESENT ILLNESS HPI     Flora Pitt is a 32 y.o. male who presents today for wound/ulcer evaluation. Current evaluation:  [facility] Wound has remained healed. Interval history: Patient presents for follow-up of right leg swelling and wound. States that a small part of the end of the wound has reopened. They will be going out of town this weekend and mother is concerned that he may be getting into a pool. Reports that the swelling has gone down and is nearly resolved. History of Wound Context: Patient presents with his mother for evaluation of right lower leg wound. She states that he fell onto concrete in July and the wound has been open since. He is currently on doxycycline per his PCP. X-ray was obtained of the right tib-fib which was negative for fracture. Patient's mother relates that he continues to have edema in the right ankle. States that he does not limp at home or complain of any pain.   Patient suffers from autism and is nonverbal.    Ulcer Identification:  Ulcer Type: traumatic  Contributing Factors: edema          PAST MEDICAL HISTORY        Diagnosis Date    Autism     Mental developmental delay 2012    Seizure disorder ([facility] [address] 2012    Seizures ([facility] [address]        PAST SURGICAL HISTORY    Past Surgical History:   Procedure Laterality Date    TOE SURGERY         FAMILY HISTORY    Family History   Problem Relation Age of Onset    High Blood Pressure Mother     Diabetes Mother     High Cholesterol Mother     High Blood Pressure Father     Heart Disease Maternal Grandmother     Heart Disease Maternal Grandfather        SOCIAL HISTORY    Social History     Tobacco Use    Smoking status: Never Smoker    Smokeless tobacco: Never Used   Vaping Use    Vaping Use: Never used   Substance Use Topics    Alcohol use: No    Drug use: No       ALLERGIES    Allergies   Allergen Reactions    Azithromycin     Bee Venom        MEDICATIONS    Current Outpatient Medications on File Prior to Encounter   Medication Sig Dispense Refill    hydroCHLOROthiazide (HYDRODIURIL) 12.5 MG tablet TAKE (1) TABLET BY MOUTH ONCE DAILY 30 tablet 11    atorvastatin (LIPITOR) 20 MG tablet TAKE 1 TABLET BY MOUTH ONE TIME A DAY 90 tablet 3    risperiDONE (RISPERDAL) 1 MG tablet Take 1 tablet by mouth nightly      Benzoyl Peroxide (BENZOYL PEROXIDE) 10 % external wash APPLY TOPICALLY TWICE DAILY 227 g 0    levETIRAcetam (KEPPRA XR) 500 MG TB24 extended release tablet 500 mg 5 times daily   3    OXTELLAR  MG TB24 Take 600 mg by mouth 4 times daily   3    Melatonin 5 MG TABS tablet Take 1.5 mg by mouth daily Takes 1/2 of 5mg tab= 2.5mg daily       Pyridoxine HCl (VITAMIN B-6) 100 MG tablet Take 100 mg by mouth daily. No current facility-administered medications on file prior to encounter. REVIEW OF SYSTEMS    Review of Systems   Unable to perform ROS: Psychiatric disorder         Objective:      /89   Pulse 83   Temp 97.1 °F (36.2 °C) (Tympanic)   Resp 18   Wt (!) 307 lb (139.3 kg)   BMI 44.05 kg/m²     Wt Readings from Last 3 Encounters:   10/20/21 (!) 307 lb (139.3 kg)   10/13/21 (!) 307 lb (139.3 kg)   10/06/21 (!) 307 lb (139.3 kg)       Physical Exam:  General:  Alert and oriented x3. In no acute distress.      Lower Extremity Physical Exam:    Vascular: DP pulses are palpable, Bilateral. PT pulses are palpable, Bilateral. CFT <3 seconds to all digits, Bilateral.  No edema, Bilateral.  Hair growth is present to the level of the digits, Bilateral.     Neuro: Unable to accurately assess    Musculoskeletal: EHL/FHL/GS/TA gross motor intact. Gross deformity is absent. Dermatologic:   Skin is atrophic at the site of previous ulceration. No open wound. There is no erythema. There is no drainage. There is no fluctuance or crepitus. No increased calor. Interdigital maceration absent, Bilateral.             XR Tib Fib:  Negative for acute process      Assessment:      Active Hospital Problems    Diagnosis Date Noted    Localized edema [R60.0] 14/91/6993    Trauma complication, late effect [T79. 9XXS] 09/03/2021    Morbidly obese ([address] [E66.01] 08/05/2020       Plan:     Treatment Note please see attached Discharge Instructions    Tubigrip to protect the site and prevent scratching from the area and new ulceration    Monitor for new wound and call if present    RTC as needed      Written patient discharge instructions given to patient and signed by patient or POA.       Orders Placed This Encounter   Medications    lidocaine (XYLOCAINE) 4 % external solution     Orders Placed This Encounter   Procedures    Initiate Outpatient Wound Care Protocol     Cleanse wound with saline    If wound contains bioburden or contamination cleanse with wound cleanser or antimicrobial solution     For normal periwound tissue without irritation nor maceration, apply topical skin protectant    For periwound tissue with irritation and/or maceration, apply zinc based product, topical steroid cream/ointment, or equivalent     For wounds with dry firm black eschar and/or without exudate, apply betadine and leave open to air      For wounds with scant/small to no exudate or drainage, apply wound gel, hydrocolloid, polymer, or equivalent and cover with secondary dressing/foam      For wounds with moderate/large exudate or drainage, apply alginate, hydrofiber, polymer, or equivalent and cover with secondary dressing/foam    For wounds with nonviable tissue requiring removal, apply chemical or mechanical debrider and cover with secondary dressing/foam    For wounds with tunneling, dead space, or cavity, fill or pack with strip/gauze/kerlex to fit and cover with secondary dressing/foam    For wounds with adequate granulation or epithelization, apply wound gel, hydrocolloid, polymer, collagen, or transparent film, and cover secondary dry dressing/foam    For wounds that need additional secondary dressing to help pad or control additional drainage/exudates, add foam, absorbent pad or hydrocolloid    For wounds with suspected or known infection, apply antimicrobial mesh and/or antimicrobial alginate/hydrofiber, or antimicrobial solution moistened gauze/kerlex, or equivalent and cover with secondary dressing/foam    Compression Management needed for edema control, apply multilayer compression or tubular garment or equivalent    Offloading Management needed for pressure relief, apply offloading shoe/boot or equivalent     Standing Status:   Standing     Number of Occurrences:   1          Discharge Instructions         [address] [place] [address]  Visit  Discharge Instructions / Physician Orders  DATE: 10/20/2021     Home Care:NONE     SUPPLIES ORDERED THRU:NONE     Wound Location: Right Pretib     Cleanse with: Keep Dry and Intact     Dressing Orders: Tubi  on right leg for protection  Frequency:      Additional Orders: Increase protein to diet (meat, cheese, eggs, fish, peanut butter, nuts and beans)  Multivitamin daily   Elevate leg if wrap starts to feel tight  MY CHART []????     Smart Device  []????      HYPERBARIC TREATMENT-                TREATMENT #     Your next appointment with the [facility] Center-call if needed                                                                                                   ROOM TYPE   []???? CHAIR     []???? BED   []???? EITHER        TIME []???? 45 MIN     []???? 60 MIN     (Please note your next appointment above and if you are unable to keep, kindly give a 24 hour notice. Thank you.)     If you experience any of the following, please call the [address] during business hours:  [phone]     * Increase in Pain  * Temperature over 101  * Increase in drainage from your wound  * Drainage with a foul odor  * Bleeding  * Increase in swelling  * Need for compression bandage changes due to slippage, breakthrough drainage.     If you need medical attention outside of the business hours of the [address] please contact your PCP or go to the nearest emergency room.     The information contained in the After Visit Summary has been reviewed with me, the patient and/or responsible adult, by my health care provider(s). I had the opportunity to ask questions regarding this information. I have elected to receive;      []?? ?? After Visit Summary  [x]????Comprehensive Discharge Instruction        Patient signature______________________________________  Electronically signed by Antoni Floyd DPM on 10/20/2021 at 2:16 PM  Electronically signed by Delisa Stewart RN on 10/20/2021 at 3:04 PM          Electronically signed by Antoni Floyd DPM on 10/20/2021 at 3:05 PM

## 2021-11-10 ENCOUNTER — HOSPITAL ENCOUNTER (OUTPATIENT)
Dept: WOUND CARE | Age: 26
Discharge: HOME | End: 2021-11-10
Payer: COMMERCIAL

## 2021-12-01 DIAGNOSIS — F84.0 AUTISTIC DISORDER: Primary | ICD-10-CM

## 2021-12-01 DIAGNOSIS — G40.309 EPILEPSY, GENERALIZED, CONVULSIVE (HCC): ICD-10-CM

## 2022-01-03 ENCOUNTER — OFFICE VISIT (OUTPATIENT)
Dept: FAMILY MEDICINE CLINIC | Age: 27
End: 2022-01-03
Payer: COMMERCIAL

## 2022-01-03 VITALS
HEIGHT: 70 IN | HEART RATE: 84 BPM | BODY MASS INDEX: 45.1 KG/M2 | WEIGHT: 315 LBS | TEMPERATURE: 97.1 F | SYSTOLIC BLOOD PRESSURE: 138 MMHG | DIASTOLIC BLOOD PRESSURE: 88 MMHG

## 2022-01-03 DIAGNOSIS — Z00.01 ENCOUNTER FOR WELL ADULT EXAM WITH ABNORMAL FINDINGS: Primary | ICD-10-CM

## 2022-01-03 DIAGNOSIS — F81.9 MENTAL DEVELOPMENTAL DELAY: ICD-10-CM

## 2022-01-03 DIAGNOSIS — I10 ESSENTIAL HYPERTENSION: ICD-10-CM

## 2022-01-03 DIAGNOSIS — F84.0 AUTISTIC DISORDER: ICD-10-CM

## 2022-01-03 DIAGNOSIS — L03.115 CELLULITIS OF RIGHT LOWER EXTREMITY: ICD-10-CM

## 2022-01-03 DIAGNOSIS — G40.909 SEIZURE DISORDER (HCC): ICD-10-CM

## 2022-01-03 DIAGNOSIS — Z23 ENCOUNTER FOR IMMUNIZATION: ICD-10-CM

## 2022-01-03 DIAGNOSIS — E66.01 CLASS 3 SEVERE OBESITY DUE TO EXCESS CALORIES WITH SERIOUS COMORBIDITY AND BODY MASS INDEX (BMI) OF 45.0 TO 49.9 IN ADULT (HCC): ICD-10-CM

## 2022-01-03 PROCEDURE — 99395 PREV VISIT EST AGE 18-39: CPT | Performed by: FAMILY MEDICINE

## 2022-01-03 PROCEDURE — G8484 FLU IMMUNIZE NO ADMIN: HCPCS | Performed by: FAMILY MEDICINE

## 2022-01-03 SDOH — ECONOMIC STABILITY: FOOD INSECURITY: WITHIN THE PAST 12 MONTHS, YOU WORRIED THAT YOUR FOOD WOULD RUN OUT BEFORE YOU GOT MONEY TO BUY MORE.: NEVER TRUE

## 2022-01-03 SDOH — ECONOMIC STABILITY: FOOD INSECURITY: WITHIN THE PAST 12 MONTHS, THE FOOD YOU BOUGHT JUST DIDN'T LAST AND YOU DIDN'T HAVE MONEY TO GET MORE.: NEVER TRUE

## 2022-01-03 ASSESSMENT — ENCOUNTER SYMPTOMS
CHEST TIGHTNESS: 0
COUGH: 0
WHEEZING: 0
EYE PAIN: 0
BLOOD IN STOOL: 0
SINUS PRESSURE: 0
ABDOMINAL DISTENTION: 0
SHORTNESS OF BREATH: 0
BACK PAIN: 0
CONSTIPATION: 0
ABDOMINAL PAIN: 0
RECTAL PAIN: 0
PHOTOPHOBIA: 0
DIARRHEA: 0

## 2022-01-03 ASSESSMENT — SOCIAL DETERMINANTS OF HEALTH (SDOH): HOW HARD IS IT FOR YOU TO PAY FOR THE VERY BASICS LIKE FOOD, HOUSING, MEDICAL CARE, AND HEATING?: NOT HARD AT ALL

## 2022-01-03 ASSESSMENT — PATIENT HEALTH QUESTIONNAIRE - PHQ9
SUM OF ALL RESPONSES TO PHQ9 QUESTIONS 1 & 2: 0
1. LITTLE INTEREST OR PLEASURE IN DOING THINGS: 0
SUM OF ALL RESPONSES TO PHQ QUESTIONS 1-9: 0
2. FEELING DOWN, DEPRESSED OR HOPELESS: 0
SUM OF ALL RESPONSES TO PHQ QUESTIONS 1-9: 0

## 2022-01-03 NOTE — PATIENT INSTRUCTIONS
Patient Education        Learning About Low-Carbohydrate Foods  What foods are low in carbohydrate? The foods you eat contain nutrients, such as vitamins and minerals. Carbohydrate is a nutrient. Your body needs the right amount to stay healthy and work as it should. You can use the list below to help you make choices about which foods to eat. Some foods that are lower in carbohydrate include:  Dairy and dairy alternatives  · Cheese  · Cottage cheese  · Cream cheese  · Nut milk (unsweetened)  · Soy milk (unsweetened)  · Yogurt (Greek, plain)  Fruits  · Avocado  · [employer] and other protein foods  · Almonds  · Beef  · Chicken  · Cod  · Eggs  · Halibut  · Peanut butter and other nut butters  · Pistachios  · Pork  · Pumpkin seeds  · Tofu  · Trout  · [place]  · [place]  · Walnuts  Vegetables  · Broccoli  · Carrots  · Cauliflower  · Green beans  · Mushrooms  · Peppers  · Salad greens  · Spinach  · Tomatoes  Work with your doctor to find out how much of this nutrient you need. Depending on your health, you may need more or less of it in your diet. Where can you learn more? Go to https://Synbody Biotechnologypepiceweb.Wander (f. YongoPal). org and sign in to your [employer] account. Enter [phone] in the [facility] box to learn more about \"Learning About Low-Carbohydrate Foods. \"     If you do not have an account, please click on the \"Sign Up Now\" link. Current as of: September 8, 2021               Content Version: 13.1  © 2006-2021 Healthwise, Incorporated. Care instructions adapted under license by [facility] ([facility]). If you have questions about a medical condition or this instruction, always ask your healthcare professional. [address] any warranty or liability for your use of this information. Patient Education        Learning About Low-Carbohydrate Diets  What is a low-carbohydrate diet? A low-carbohydrate (or \"low-carb\") diet limits foods and drinks that have carbohydrates.  This includes grains, fruits, milk and yogurt, and starchy vegetables like potatoes, beans, and corn. It also avoids foods and drinks that have added sugar. Instead, low-carb diets include foods that are high in protein and fat. Why might you follow a low-carb diet? Low-carb diets may be used for a variety of reasons, such as for weight loss. People who have diabetes may use a low-carb diet to help manage their blood sugar levels. What should you do before you start the diet? Talk to your doctor before you try any diet. This is even more important if you have health problems like kidney disease, heart disease, or diabetes. Your doctor may suggest that you meet with a registered dietitian. A dietitian can help you make an eating plan that works for you. What foods do you eat on a low-carb diet? On a low-carb diet, you choose foods that are high in protein and fat. Examples of these are:  · Meat, poultry, and fish. · Eggs. · Nuts, such as walnuts, pecans, almonds, and peanuts. · Peanut butter and other nut butters. · Tofu. · Avocado. · Veronica Merlin. · Non-starchy vegetables like broccoli, cauliflower, green beans, mushrooms, peppers, lettuce, and spinach. · Unsweetened non-dairy milks like almond milk and coconut milk. · Cheese, cottage cheese, and cream cheese. Current as of: September 8, 2021               Content Version: 13.1  © 2006-2021 Healthwise, [facility]. Care instructions adapted under license by [facility] ([facility]). If you have questions about a medical condition or this instruction, always ask your healthcare professional. [place] [address] any warranty or liability for your use of this information. Patient Education        Learning About Low-Fat Eating  What is low-fat eating? Most food has some fat in it. Your body needs some fat to be healthy. But some kinds of fats are healthier than others.   In a low-fat eating plan, you try to choose healthier fats and eat fewer unhealthy fats. Healthy fats include olive and canola oil. Try to avoid eating too much saturated fat, such as in cheese and meats. You do not need to cut all fat from your diet. But you can make healthier choices about the types and amount of fat you eat. Even though it is a good idea to choose healthier fats, it is still important to be careful of how much fat you eat, because all fats are high in calories. What are the different types of fats? Unhealthy fat  · Saturated fat. Saturated fats are mostly in animal foods, such as meat and dairy foods. Tropical oils, such as coconut oil, palm oil, and cocoa butter, are also saturated fats. Healthy fats  · Monounsaturated fat. Monounsaturated fats are liquid at room temperature but get solid when refrigerated. Eating foods that are high in this fat may help lower your \"bad\" (LDL) cholesterol, keep your \"good\" (HDL) cholesterol level up, and lower your chances of getting coronary artery disease. This fat is found in canola oil, olive oil, peanut oil, olives, avocados, nuts, and nut butters. · Polyunsaturated fat. Polyunsaturated fats are liquid at room temperature. They are in safflower, sunflower, and corn oils. They are also the main fat in seafood. Omega-3 fatty acids are types of polyunsaturated fat. Eating fish may lower your chances of getting coronary artery disease. Fatty fish such as salmon and mackerel contain these healthy fatty acids. So do ground flaxseeds and flaxseed oil, soybeans, walnuts, and seeds. Why cut down on unhealthy fats? Eating foods that contain saturated fats can raise the LDL (\"bad\") cholesterol in your blood. Having a high level of LDL cholesterol increases your chance of hardening of the arteries (atherosclerosis), which can lead to heart disease, heart attack, and stroke. In general:  · No more than 10% of your daily calories should come from saturated fat. This is about 20 grams in a 2,000-calorie diet.   · No more than 10% of your daily calories should come from polyunsaturated fat. This is about 20 grams in a 2,000-calorie diet. · Monounsaturated fats can be up to 15% of your daily calories. This is about 25 to 30 grams in a 2,000-calorie diet. If you're not sure how much fat you should be eating or how many calories you need each day to stay at a healthy weight, talk to a registered dietitian. A dietitian can help you create a plan that's right for you. What can you do to cut down on fat? Foods like cheese, butter, sausage, and desserts can have a lot of unhealthy fats. Try these tips for healthier meals at home and when you eat out. At home  · Fill up on fruits, vegetables, and whole grains. · Think of meat as a side dish instead of as the main part of your meal.  · When you do eat meat, make it extra-lean ground beef (97% lean), ground turkey breast (without skin added), meats with fat trimmed off before cooking, or skinless chicken. · Try main dishes that use whole wheat pasta, brown rice, dried beans, or vegetables. · Use cooking methods that use little or no fat, such as broiling, steaming, or grilling. Use cooking spray instead of oil. If you use oil, use a monounsaturated oil, such as canola or olive oil. · Read food labels on canned, bottled, or packaged foods. Choose those with little saturated fat. When eating out at a restaurant  · Order foods that are broiled or poached instead of fried or breaded. · Cut back on the amount of butter or margarine that you use on bread. Use small amounts of olive oil instead. · Order sauces, gravies, and salad dressings on the side, and use only a little. · When you order pasta, choose tomato sauce instead of cream sauce. · Ask for salsa with your baked potato instead of sour cream, butter, cheese, or valdez. Where can you learn more? Go to https://taty.healthAstaro. org and sign in to your [employer] account.  Enter T527 in the [employer] box to learn more about \"Learning About Low-Fat Eating. \"     If you do not have an account, please click on the \"Sign Up Now\" link. Current as of: September 8, 2021               Content Version: 13.1  © 9503-4741 Healthwise, Incorporated. Care instructions adapted under license by [facility] ([facility]). If you have questions about a medical condition or this instruction, always ask your healthcare professional. [address] any warranty or liability for your use of this information.

## 2022-01-03 NOTE — PROGRESS NOTES
Visit Information    Have you changed or started any medications since your last visit including any over-the-counter medicines, vitamins, or herbal medicines? no   Are you having any side effects from any of your medications? -  no  Have you stopped taking any of your medications? Is so, why? -  no    Have you seen any other physician or provider since your last visit? Yes - Records Obtained  Have you had any other diagnostic tests since your last visit? Yes - Records Obtained  Have you been seen in the emergency room and/or had an admission to a hospital since we last saw you? No  Have you had your routine dental cleaning in the past 6 months? no    Have you activated your [employer] account? If not, what are your barriers?  Yes     Patient Care Team:  Tulio Cordero MD as PCP - General (Family Medicine)  Tulio Cordero MD as PCP - [facility]  Samanta Mccann MD as Consulting Physician (Neurology)  ROBERTO Couch - CNP  Chris Alegre MD as Surgeon (General Surgery)    Medical History Review  Past Medical, Family, and Social History reviewed and does contribute to the patient presenting condition    Health Maintenance   Topic Date Due    COVID-19 Vaccine (1) Never done    Flu vaccine (1) Never done    HPV vaccine (1 - Male 2-dose series) 01/27/2022 (Originally 4/8/2006)    Lipid screen  04/01/2022    Depression Screen  08/26/2022    Potassium monitoring  08/26/2022    Creatinine monitoring  08/26/2022    DTaP/Tdap/Td vaccine (2 - Td or Tdap) 10/27/2026    Hepatitis C screen  Completed    Hepatitis A vaccine  Aged Out    Hepatitis B vaccine  Aged Out    Hib vaccine  Aged Out    Meningococcal (ACWY) vaccine  Aged Out    Pneumococcal 0-64 years Vaccine  Aged Out    Varicella vaccine  Discontinued    HIV screen  Discontinued

## 2022-01-03 NOTE — PROGRESS NOTES
Well Adult Note  Name: Gissel Calderon Date: 1/3/2022   MRN: [id number] Sex: Male   Age: 32 y.o. Ethnicity: Non- / Non    : 1995 Race: White (non-)      Waldo Weller is here for well adult exam.  History: Patient presented with his mom who is guardian for patient. Patient has history of seizure disorder autistic disorder with no speech. Patient has history of hypertension on hydrochlorothiazide stable. Patient is compliant with medications denies any side effects. History of seizure disorder is on multiple medications follows with psychiatrist.    Autistic disorder stable. He is not able to read letters, mom reports he never got his eyes checked encouraged to schedule appointment with optometrist.    He had history of cellulitis of lower extremity which is completely resolved. Mom is concerned about his weight gain, reports he has recently gained about 9 pounds. Mom reports he is not physically active he eats and sits and watches TV. He is not able to do any work. He does minor colds at home. She does not watch weight daily. Mom reports he tried multiple jobs but was not able to hold. Review of Systems   Constitutional: Positive for activity change, appetite change and unexpected weight change. Negative for diaphoresis and fatigue. HENT: Negative for congestion, nosebleeds, postnasal drip and sinus pressure. Eyes: Negative for photophobia, pain and visual disturbance. Respiratory: Negative for cough, chest tightness, shortness of breath and wheezing. Cardiovascular: Negative for chest pain, palpitations and leg swelling. Gastrointestinal: Negative for abdominal distention, abdominal pain, blood in stool, constipation, diarrhea and rectal pain. Endocrine: Negative for polyuria. Genitourinary: Negative for difficulty urinating, frequency, hematuria and urgency.    Musculoskeletal: Negative for arthralgias, back pain, gait problem, joint swelling, myalgias, neck pain and neck stiffness. Skin: Negative for rash. Neurological: Positive for speech difficulty. Negative for dizziness, tremors, weakness, light-headedness, numbness and headaches. Psychiatric/Behavioral: Positive for agitation, behavioral problems and decreased concentration. Negative for dysphoric mood, hallucinations, sleep disturbance and suicidal ideas. The patient is nervous/anxious. The patient is not hyperactive. Allergies   Allergen Reactions    Azithromycin     Bee Venom          Prior to Visit Medications    Medication Sig Taking? Authorizing Provider   hydroCHLOROthiazide (HYDRODIURIL) 12.5 MG tablet TAKE (1) TABLET BY MOUTH ONCE DAILY Yes ROBERTO Hyatt CNP   atorvastatin (LIPITOR) 20 MG tablet TAKE 1 TABLET BY MOUTH ONE TIME A DAY Yes ROBERTO Hyatt CNP   risperiDONE (RISPERDAL) 1 MG tablet Take 1 tablet by mouth nightly Yes Historical Provider, MD   Benzoyl Peroxide (BENZOYL PEROXIDE) 10 % external wash APPLY TOPICALLY TWICE DAILY Yes ROBERTO Cook CNP   levETIRAcetam (KEPPRA XR) 500 MG TB24 extended release tablet 500 mg 5 times daily  Yes Historical Provider, MD   OXTELLAR  MG TB24 Take 600 mg by mouth 4 times daily  Yes Historical Provider, MD   Melatonin 5 MG TABS tablet Take 1.5 mg by mouth daily Takes 1/2 of 5mg tab= 2.5mg daily  Yes Historical Provider, MD   Pyridoxine HCl (VITAMIN B-6) 100 MG tablet Take 100 mg by mouth daily.    Yes Historical Provider, MD         Past Medical History:   Diagnosis Date    Autism     Mental developmental delay 8/9/2012    Seizure disorder ([facility] [address] 8/9/2012    [facility]        Past Surgical History:   Procedure Laterality Date    TOE SURGERY           Family History   Problem Relation Age of Onset    High Blood Pressure Mother     Diabetes Mother     High Cholesterol Mother     High Blood Pressure Father     Heart Disease Maternal Grandmother     Heart Disease Maternal Grandfather        Social History     Tobacco Use    Smoking status: Never Smoker    Smokeless tobacco: Never Used   Vaping Use    Vaping Use: Never used   Substance Use Topics    Alcohol use: No    Drug use: No       Objective   /88   Pulse 84   Temp 97.1 °F (36.2 °C)   Ht 5' 10\" (1.778 m)   Wt (!) 316 lb (143.3 kg)   BMI 45.34 kg/m²   Wt Readings from Last 3 Encounters:   01/03/22 (!) 316 lb (143.3 kg)   10/20/21 (!) 307 lb (139.3 kg)   10/13/21 (!) 307 lb (139.3 kg)     There were no vitals filed for this visit. Physical Exam  Vitals and nursing note reviewed. Constitutional:       Appearance: Normal appearance. He is obese. HENT:      Right Ear: Tympanic membrane normal.      Left Ear: Tympanic membrane normal.      Nose: Nose normal. No congestion. Mouth/Throat:      Mouth: Mucous membranes are moist.   Eyes:      Pupils: Pupils are equal, round, and reactive to light. Cardiovascular:      Rate and Rhythm: Normal rate and regular rhythm. Heart sounds: Normal heart sounds. Pulmonary:      Effort: Pulmonary effort is normal.      Breath sounds: Normal breath sounds. No wheezing or rhonchi. Abdominal:      General: Bowel sounds are normal. There is no distension. Palpations: Abdomen is soft. Tenderness: There is no abdominal tenderness. Musculoskeletal:         General: No swelling, tenderness, deformity or signs of injury. Normal range of motion. Cervical back: Normal range of motion. Right lower leg: No edema. Left lower leg: No edema. Neurological:      General: No focal deficit present. Mental Status: He is alert and oriented to person, place, and time. Cranial Nerves: Dysarthria present. No cranial nerve deficit. Sensory: No sensory deficit. Motor: No weakness or tremor.       Coordination: Coordination normal.      Gait: Gait normal.      Deep Tendon Reflexes: Reflexes normal.   Psychiatric:         Attention and Perception: Attention normal.         Mood and Affect: Mood is anxious. Mood is not depressed. Speech: He is noncommunicative. Speech is not rapid and pressured. Behavior: Behavior is slowed. Behavior is not agitated. Thought Content: Thought content is not paranoid. Social History     Socioeconomic History    Marital status: Single     Spouse name: Not on file    Number of children: Not on file    Years of education: Not on file    Highest education level: Not on file   Occupational History    Not on file   Tobacco Use    Smoking status: Never Smoker    Smokeless tobacco: Never Used   Vaping Use    Vaping Use: Never used   Substance and Sexual Activity    Alcohol use: No    Drug use: No    Sexual activity: Not on file   Other Topics Concern    Not on file   Social History Narrative    ** Merged History Encounter **          Social Determinants of Health     Financial Resource Strain: Low Risk     Difficulty of Paying Living Expenses: Not hard at all   Food Insecurity: No Food Insecurity    Worried About [address] in the Last Year: Never true    [address] in the Last Year: Never true   Transportation Needs:     Lack of Transportation (Medical): Not on file    Lack of Transportation (Non-Medical):  Not on file   Physical Activity:     Days of Exercise per Week: Not on file    Minutes of Exercise per Session: Not on file   Stress:     Feeling of Stress : Not on file   Social Connections:     Frequency of Communication with Friends and Family: Not on file    Frequency of Social Gatherings with Friends and Family: Not on file    Attends Congregational Services: Not on file    Active Member of Clubs or Organizations: Not on file    Attends Club or Organization Meetings: Not on file    Marital Status: Not on file   Intimate Partner Violence:     Fear of Current or Ex-Partner: Not on file    Emotionally Abused: Not on file    Physically Abused: Not on file    Sexually Abused: Not on file   Housing Stability:     Unable to Pay for Housing in the Last Year: Not on file    Number of Places Lived in the Last Year: Not on file    Unstable Housing in the Last Year: Not on file         Assessment   Plan   1. Encounter for well adult exam with abnormal findings    Patient is up-to-date on blood work. Refused vaccines  2. Essential hypertension  Controlled continue same medications  3. Seizure disorder ([address]    Stable continue same medications follow-up with neurologist  4. Class 3 severe obesity due to excess calories with serious comorbidity and body mass index (BMI) of 45.0 to 49.9 in adult [facility])  Had long discussion with mom handout also provided for diet and physical activity, discussed to cut down on carbs and fats and increase physical activity increase daily course cut down on TV time. 5. Mental developmental delay  6. Autistic disorder  7. Cellulitis of right lower extremity  8.  Encounter for immunization  -     INFLUENZA, MDCK QUADV, 2 YRS AND OLDER, IM, PF, PREFILL SYR OR SDV, 0.5ML (FLUCELVAX QUADV, PF)         Personalized Preventive Plan   Current Health Maintenance Status  Immunization History   Administered Date(s) Administered    Meningococcal MCV4P (Menactra) 10/27/2016    Tdap (Boostrix, Adacel) 10/27/2016        Health Maintenance   Topic Date Due    COVID-19 Vaccine (1) Never done    Flu vaccine (1) Never done    HPV vaccine (1 - Male 2-dose series) 01/27/2022 (Originally 4/8/2006)    Lipid screen  04/01/2022    Depression Screen  08/26/2022    Potassium monitoring  08/26/2022    Creatinine monitoring  08/26/2022    DTaP/Tdap/Td vaccine (2 - Td or Tdap) 10/27/2026    Hepatitis C screen  Completed    Hepatitis A vaccine  Aged Out    Hepatitis B vaccine  Aged Out    Hib vaccine  Aged Out    Meningococcal (ACWY) vaccine  Aged Out    Pneumococcal 0-64 years Vaccine  Aged Out    Varicella vaccine  Discontinued    HIV screen Discontinued     Recommendations for Preventive Services Due: see orders and patient instructions/AVS.    Return in about 4 months (around 5/3/2022).

## 2022-02-25 DIAGNOSIS — E78.2 MIXED HYPERLIPIDEMIA: ICD-10-CM

## 2022-02-25 RX ORDER — ATORVASTATIN CALCIUM 20 MG/1
TABLET, FILM COATED ORAL
Qty: 90 TABLET | Refills: 0 | Status: SHIPPED | OUTPATIENT
Start: 2022-02-25 | End: 2022-06-02

## 2022-02-25 NOTE — TELEPHONE ENCOUNTER
Please Approve or Refuse.   Send to Pharmacy per Pt's Request:     Next Visit Date:  5/3/2022   Last Visit Date: 1/3/2022    Hemoglobin A1C (%)   Date Value   08/26/2021 4.9   08/22/2015 5.2             ( goal A1C is < 7)   BP Readings from Last 3 Encounters:   01/03/22 138/88   10/20/21 129/89   10/13/21 (!) 176/106          (goal 120/80)  BUN   Date Value Ref Range Status   08/26/2021 13 6 - 20 mg/dL Final     CREATININE   Date Value Ref Range Status   08/26/2021 1.01 0.70 - 1.20 mg/dL Final     Potassium   Date Value Ref Range Status   08/26/2021 3.6 (L) 3.7 - 5.3 mmol/L Final

## 2022-04-12 DIAGNOSIS — I10 ESSENTIAL HYPERTENSION: ICD-10-CM

## 2022-04-12 RX ORDER — HYDROCHLOROTHIAZIDE 12.5 MG/1
TABLET ORAL
Qty: 90 TABLET | Refills: 1 | Status: SHIPPED | OUTPATIENT
Start: 2022-04-12 | End: 2022-09-14 | Stop reason: SDUPTHER

## 2022-05-02 NOTE — PROGRESS NOTES
[facility] PHYSICIANS  [facility] PHYSICIANS [facility]  [address]  [address] [place] [facility] [address]  Dept: [address] [facility] (:  1995) is a 32 y.o. male. Patient is here for evaluation of the following chief complaint(s):  Chief Complaint   Patient presents with    Obesity     worried about him being overweight         SUBJECTIVE/OBJECTIVE:  HPI  Piter Sahu is a 32 y.o. male patient. Patient is an established patient of mine. However, this is the first time I am seeing him. Patient came in with his mom. Patient is nonverbal.   Patient has a known history of hypertension, hyperlipoidemia, seizure, autistic disorder, acne vulgaris, and obesity. [place] SEIZURE  Patient has a known history of seizure. He has had some grand mal seizures since he was 15year old. Patient is established with a neurologist he is seeing. Dr. Ortencia Homans. Patient is being seen on a regular basis. Patient had an EEG about 8 years ago. Patient mother noted that he had a 72-hour EEG but was told that the EEG did not show any seizure. Patient is currently on [address]. Patient have not had any Keppra level done. Patient's last seizure was over a year ago. HYPERTENSION  Piter Sahu has a well controlled hypertension. he is currently on hydrochlorothiazide. Patient's most recent BP in the office was stable. he reports stable BP readings at home. Patient denies any adverse reaction to this therapy. he denies any CP, SOB, HA, or palpitations. Patient admits to exercising and adheres to low salt diet. No history of organ damage due to condition noted. Lab Results   Component Value Date/Time    CREATININE 1.01 2021 10:10 AM     HYPERLIPIDEMIA  Piter Sahu is currently on atorvastatin (Lipitor). Patient denies adverse reaction to this medication. Compliance with treatment thus far has been good. The patient is not known to have coexisting coronary artery disease.  The CVD Risk score Jose Fu et al., 2008) failed to calculate for the following reasons: The 2008 CVD risk score is only valid for ages 27 to 76  Lab Results   Component Value Date/Time    CHOLFAST 161 04/01/2021 12:44 PM    CHOL 137 01/16/2020 03:55 PM    HDL 44 04/01/2021 12:44 PM    LDLCHOLESTEROL 101 04/01/2021 12:44 PM    TRIGLYCFAST 81 04/01/2021 12:44 PM    CHOLHDLRATIO 3.7 04/01/2021 12:44 PM    TRIG 177 (H) 01/16/2020 03:55 PM    VLDL NOT REPORTED 04/01/2021 12:44 PM     AUTISTIC DISORDER/ PHYSICAL  [address] 's mother reported that patient can get somewhat physical.  He is currently on risperidone doing well. Patient is able to follow directions fairly well. Patient is able to respond yes or no to some questions. .  PHQ-2 Over the past 2 weeks, how often have you been bothered by any of the following problems? Little interest or pleasure in doing things: Not at all  Feeling down, depressed, or hopeless: Not at all  PHQ-2 Score: 0  PHQ-9 Over the past 2 weeks, how often have you been bothered by any of the following problems? PHQ-9 Total Score: 0  PHQ-9 Total Score: 0   No flowsheet data found. DEPRESSION SCREENING: Negative  PHQ Scores 5/3/2022 1/3/2022 8/26/2021 8/5/2020 2/21/2019 8/7/2017   PHQ2 Score 0 0 0 0 0 0   PHQ9 Score 0 0 0 0 0 0     WEIGHT  Patient's BMI is Body mass index is 45.03 kg/m². kg/m2. BMI is increasing. Patient understands that this condition increases the patient's risk for chronic conditions. Wt Readings from Last 3 Encounters:   05/03/22 (!) 313 lb 12.8 oz (142.3 kg)   01/03/22 (!) 316 lb (143.3 kg)   10/20/21 (!) 307 lb (139.3 kg)       VITAL SIGNS:  Vitals:    05/03/22 1115   BP: (!) 140/94   Pulse: 85   Temp: 98.6 °F (37 °C)   SpO2: 97%   Weight: (!) 313 lb 12.8 oz (142.3 kg)   Height: 5' 10\" (1.778 m)   Estimated body mass index is 45.03 kg/m² as calculated from the following:    Height as of this encounter: 5' 10\" (1.778 m).     Weight as of this encounter: 313 lb 12.8 oz (142.3 kg). Review of Systems   Constitutional: Positive for unexpected weight change. Negative for chills and fever. HENT: Negative. Respiratory: Negative. Negative for shortness of breath and wheezing. Cardiovascular: Negative. Negative for chest pain and palpitations. Gastrointestinal: Positive for abdominal distention and rectal pain. Negative for abdominal pain. Endocrine: Negative. Musculoskeletal: Negative for arthralgias. Skin: Positive for color change. acne   Neurological: Positive for seizures. Negative for headaches. Psychiatric/Behavioral: Positive for behavioral problems and dysphoric mood. Negative for sleep disturbance. The patient is not nervous/anxious. Physical Exam  Vitals and nursing note reviewed. HENT:      Head: Normocephalic. Nose: Nose normal.      Mouth/Throat:      Comments: Short neck  Cardiovascular:      Rate and Rhythm: Normal rate and regular rhythm. Pulmonary:      Effort: Pulmonary effort is normal.   Abdominal:      General: Abdomen is protuberant. There is no distension. Tenderness: There is no abdominal tenderness. Comments: obese   Skin:     General: Skin is warm and dry. Findings: Acne (facial) present. Neurological:      Mental Status: He is alert. Psychiatric:         Mood and Affect: Mood is not anxious. Affect is flat. Speech: Speech is delayed. Behavior: Behavior is withdrawn. Thought Content: Thought content does not include suicidal ideation. Comments: Poor eye contact, non verbal         MEDICAL HISTORY      Diagnosis Date    Autism     Mental developmental delay 8/9/2012    Seizure disorder ([facility] [address] 8/9/2012    Seizures ([facility]       MEDICATIONS  Prior to Visit Medications    Medication Sig Taking?  Authorizing Provider   hydroCHLOROthiazide (HYDRODIURIL) 12.5 MG tablet TAKE (1) TABLET BY MOUTH ONCE DAILY Yes Coreen Mcdonald, APRN - CNP   atorvastatin (LIPITOR) 20 MG tablet TAKE 1 TABLET BY MOUTH ONE TIME A DAY Yes Coreen Mcdonald, APRN - CNP   risperiDONE (RISPERDAL) 1 MG tablet Take 1 tablet by mouth nightly Yes Historical Provider, MD   Benzoyl Peroxide (BENZOYL PEROXIDE) 10 % external wash APPLY TOPICALLY TWICE DAILY Yes Harini Mandujano APRN - CNP   levETIRAcetam (KEPPRA XR) 500 MG TB24 extended release tablet 500 mg 5 times daily  Yes Historical Provider, MD   OXTELLAR  MG TB24 Take 600 mg by mouth 4 times daily  Yes Historical Provider, MD   Melatonin 5 MG TABS tablet Take 1.5 mg by mouth daily Takes 1/2 of 5mg tab= 2.5mg daily  Yes Historical Provider, MD   Pyridoxine HCl (VITAMIN B-6) 100 MG tablet Take 100 mg by mouth daily. Yes Historical Provider, MD     Controlled Substance Monitoring:  Acute and Chronic Pain Monitoring:   No flowsheet data found. ASSESSMENT/PLAN:  1. Essential hypertension  Stable  Continue current therapy. DISCUSSED AND ADVISED TO:  Cut down on your salt intake. Cut down on caffeinated drinks, sports drinks. Instructed to check BP at home regularly. Report for any chest pains, shortness of breath, headaches, and lightheadedness. Call the office if your blood pressure continue to be higher than 140/90 or 90/50.        2. Mixed hyperlipidemia  Stable  Continue therapy. Advised to decrease the consumption of red meats, fried foods, trans fats, sweets, sugary beverages. Advised to increase fish, vegetables, and fruits consumption. Advised to add fiber or OTC supplements in diet. Discussed weight loss which will result in improvement of lipids levels. Advised to increase daily physical activities and add regular exercises. - Lipid, Fasting; Future  3. Seizure disorder ([address]  Stable  Current therapy  Check Keppra level  Encouraged to continue seeing neurologist  Continue to monitor    - Levetiracetam Level; Future    4. Autistic disorder  Stable  Continue to monitor  Discussed how to recognize anxiety.   Advised to relieve tension with exercise or a massage. Advised to get enough rest.  Advised to avoid alcohol, caffeine, nicotine, and illegal drugs. Which can increase anxiety level and cause sleep problems. 5. Acne vulgaris  Stable  ADVISED TO:  Wash face at least twice a day  Use prescription cleansers  Report for worsening redness, swelling, and increasing numbers of pimples. 6. Morbid obesity with BMI of 45.0-49.9, adult ([address]  Failure to Improve  BMI increasing  DISCUSSED AND ADVISED TO:  Eat a low-fat and low carbohydrates diet. Avoid fried foods especially fast food. Choose healthier options for snacks. Have 5-6 servings of fruits and vegetables per day. Cut down on eating processed food. Add 30 minutes to 1 hour aerobic exercise for 3-4 days a week. 7. Weight gain  Failure to Improve  Continue to evaluate  Recommend     - CBC with Auto Differential; Future  - Comprehensive Metabolic Panel, Fasting; Future  - Hemoglobin A1C; Future  - Urinalysis with Reflex to Culture; Future  - TSH; Future    8. Hyperglycemia  Failure to Improve  Continue to evaluate  Recommend     - CBC with Auto Differential; Future  - Comprehensive Metabolic Panel, Fasting; Future  - Hemoglobin A1C; Future  - Urinalysis with Reflex to Culture; Future        9. Encounter for vitamin deficiency screening  Failure to Improve  Continue to evaluate  Recommend     - Vitamin D 25 Hydroxy; Future     On this date 5/3/2022 I have spent 40 minutes reviewing previous notes, test results and face to face with the patient discussing the diagnosis and importance of compliance with the treatment plan as well as documenting on the day of the visit. Return in about 4 months (around 9/3/2022) for Chronic conditions, 30mins. This note was completed by using the assistance of a speech-recognition program. However, inadvertent computerized transcription errors may be present.  Although every effort was made to ensure accuracy, no guarantees can be provided that every mistake has been identified and corrected by editing.   Electronically signed by ROBERTO Peña CNP on 5/2/22 at 5:20 PM EDT     --ROBERTO Peña CNP

## 2022-05-03 ENCOUNTER — OFFICE VISIT (OUTPATIENT)
Dept: FAMILY MEDICINE CLINIC | Age: 27
End: 2022-05-03
Payer: COMMERCIAL

## 2022-05-03 VITALS
OXYGEN SATURATION: 97 % | SYSTOLIC BLOOD PRESSURE: 140 MMHG | DIASTOLIC BLOOD PRESSURE: 94 MMHG | TEMPERATURE: 98.6 F | HEART RATE: 85 BPM | BODY MASS INDEX: 44.92 KG/M2 | HEIGHT: 70 IN | WEIGHT: 313.8 LBS

## 2022-05-03 DIAGNOSIS — Z13.21 ENCOUNTER FOR VITAMIN DEFICIENCY SCREENING: ICD-10-CM

## 2022-05-03 DIAGNOSIS — G40.909 SEIZURE DISORDER (HCC): ICD-10-CM

## 2022-05-03 DIAGNOSIS — L70.0 ACNE VULGARIS: ICD-10-CM

## 2022-05-03 DIAGNOSIS — I10 ESSENTIAL HYPERTENSION: Primary | ICD-10-CM

## 2022-05-03 DIAGNOSIS — R73.9 HYPERGLYCEMIA: ICD-10-CM

## 2022-05-03 DIAGNOSIS — E66.01 MORBID OBESITY WITH BMI OF 45.0-49.9, ADULT (HCC): ICD-10-CM

## 2022-05-03 DIAGNOSIS — R63.5 WEIGHT GAIN: ICD-10-CM

## 2022-05-03 DIAGNOSIS — F84.0 AUTISTIC DISORDER: ICD-10-CM

## 2022-05-03 DIAGNOSIS — E78.2 MIXED HYPERLIPIDEMIA: ICD-10-CM

## 2022-05-03 PROCEDURE — 99214 OFFICE O/P EST MOD 30 MIN: CPT | Performed by: FAMILY MEDICINE

## 2022-05-03 PROCEDURE — 81003 URINALYSIS AUTO W/O SCOPE: CPT | Performed by: FAMILY MEDICINE

## 2022-05-03 PROCEDURE — G8417 CALC BMI ABV UP PARAM F/U: HCPCS | Performed by: FAMILY MEDICINE

## 2022-05-03 PROCEDURE — 1036F TOBACCO NON-USER: CPT | Performed by: FAMILY MEDICINE

## 2022-05-03 PROCEDURE — G8427 DOCREV CUR MEDS BY ELIG CLIN: HCPCS | Performed by: FAMILY MEDICINE

## 2022-05-03 ASSESSMENT — PATIENT HEALTH QUESTIONNAIRE - PHQ9
SUM OF ALL RESPONSES TO PHQ QUESTIONS 1-9: 0
1. LITTLE INTEREST OR PLEASURE IN DOING THINGS: 0
SUM OF ALL RESPONSES TO PHQ QUESTIONS 1-9: 0
2. FEELING DOWN, DEPRESSED OR HOPELESS: 0
SUM OF ALL RESPONSES TO PHQ9 QUESTIONS 1 & 2: 0

## 2022-05-03 ASSESSMENT — ENCOUNTER SYMPTOMS
ROS SKIN COMMENTS: ACNE
COLOR CHANGE: 1
SHORTNESS OF BREATH: 0
ABDOMINAL PAIN: 0
WHEEZING: 0
RESPIRATORY NEGATIVE: 1
RECTAL PAIN: 1
ABDOMINAL DISTENTION: 1

## 2022-05-03 NOTE — PROGRESS NOTES
Visit Information    Have you changed or started any medications since your last visit including any over-the-counter medicines, vitamins, or herbal medicines? no   Have you stopped taking any of your medications? Is so, why? -  no  Are you having any side effects from any of your medications? - no    Have you seen any other physician or provider since your last visit? yes    Have you had any other diagnostic tests since your last visit?  no   Have you been seen in the emergency room and/or had an admission in a hospital since we last saw you?  no   Have you had your routine dental cleaning in the past 6 months?  no     Do you have an active MyChart account? If no, what is the barrier?   Yes    Patient Care Team:  ROBERTO Atkinson CNP as PCP - General (Family Medicine)  ROBERTO Zacarias CNP as PCP - [facility] Provider  Mike Shin MD as Consulting Physician (Neurology)  ROBERTO Cabrera CNP, MD as Surgeon (General Surgery)    Medical History Review  Past Medical, Family, and Social History reviewed and does contribute to the patient presenting condition    Health Maintenance   Topic Date Due    COVID-19 Vaccine (1) Never done    Lipids  04/01/2022    Potassium  08/26/2022    Creatinine  08/26/2022    Flu vaccine (Season Ended) 09/01/2022    Depression Screen  01/03/2023    DTaP/Tdap/Td vaccine (2 - Td or Tdap) 10/27/2026    Hepatitis C screen  Completed    Hepatitis A vaccine  Aged Out    Hepatitis B vaccine  Aged Out    Hib vaccine  Aged Out    Meningococcal (ACWY) vaccine  Aged Out    Pneumococcal 0-64 years Vaccine  Aged Out    Varicella vaccine  Discontinued    HIV screen  Discontinued

## 2022-05-12 ENCOUNTER — HOSPITAL ENCOUNTER (OUTPATIENT)
Age: 27
Discharge: HOME | End: 2022-05-12
Payer: COMMERCIAL

## 2022-05-12 LAB
ABSOLUTE EOS #: 0.23 K/UL (ref 0–0.44)
ABSOLUTE IMMATURE GRANULOCYTE: <0.03 K/UL (ref 0–0.3)
ABSOLUTE LYMPH #: 1.86 K/UL (ref 1.1–3.7)
ABSOLUTE MONO #: 0.64 K/UL (ref 0.1–1.2)
ALBUMIN SERPL-MCNC: 4.8 G/DL (ref 3.5–5.2)
ALBUMIN/GLOBULIN RATIO: 1.7 (ref 1–2.5)
ALP BLD-CCNC: 83 U/L (ref 40–129)
ALT SERPL-CCNC: 37 U/L (ref 5–41)
ANION GAP SERPL CALCULATED.3IONS-SCNC: 13 MMOL/L (ref 9–17)
AST SERPL-CCNC: 30 U/L
BASOPHILS # BLD: 0 % (ref 0–2)
BASOPHILS ABSOLUTE: 0.03 K/UL (ref 0–0.2)
BILIRUB SERPL-MCNC: 0.49 MG/DL (ref 0.3–1.2)
BUN BLDV-MCNC: 14 MG/DL (ref 6–20)
CALCIUM SERPL-MCNC: 9.6 MG/DL (ref 8.6–10.4)
CHLORIDE BLD-SCNC: 101 MMOL/L (ref 98–107)
CHOLESTEROL/HDL RATIO: 3.9
CHOLESTEROL: 156 MG/DL
CO2: 26 MMOL/L (ref 20–31)
CREAT SERPL-MCNC: 0.91 MG/DL (ref 0.7–1.2)
EOSINOPHILS RELATIVE PERCENT: 3 % (ref 1–4)
ESTIMATED AVERAGE GLUCOSE: 103 MG/DL
GFR AFRICAN AMERICAN: >60 ML/MIN
GFR NON-AFRICAN AMERICAN: >60 ML/MIN
GFR SERPL CREATININE-BSD FRML MDRD: ABNORMAL ML/MIN/{1.73_M2}
GLUCOSE BLD-MCNC: 96 MG/DL (ref 70–99)
HBA1C MFR BLD: 5.2 % (ref 4–6)
HCT VFR BLD CALC: 48 % (ref 40.7–50.3)
HDLC SERPL-MCNC: 40 MG/DL
HEMOGLOBIN: 16 G/DL (ref 13–17)
IMMATURE GRANULOCYTES: 0 %
LDL CHOLESTEROL: 90 MG/DL (ref 0–130)
LYMPHOCYTES # BLD: 26 % (ref 24–43)
MCH RBC QN AUTO: 29.2 PG (ref 25.2–33.5)
MCHC RBC AUTO-ENTMCNC: 33.3 G/DL (ref 28.4–34.8)
MCV RBC AUTO: 87.6 FL (ref 82.6–102.9)
MONOCYTES # BLD: 9 % (ref 3–12)
NRBC AUTOMATED: 0 PER 100 WBC
PDW BLD-RTO: 13.2 % (ref 11.8–14.4)
PLATELET # BLD: 290 K/UL (ref 138–453)
PMV BLD AUTO: 11 FL (ref 8.1–13.5)
POTASSIUM SERPL-SCNC: 3.3 MMOL/L (ref 3.7–5.3)
RBC # BLD: 5.48 M/UL (ref 4.21–5.77)
SEG NEUTROPHILS: 62 % (ref 36–65)
SEGMENTED NEUTROPHILS ABSOLUTE COUNT: 4.35 K/UL (ref 1.5–8.1)
SODIUM BLD-SCNC: 140 MMOL/L (ref 135–144)
TOTAL PROTEIN: 7.7 G/DL (ref 6.4–8.3)
TRIGL SERPL-MCNC: 128 MG/DL
TSH SERPL DL<=0.05 MIU/L-ACNC: 1.85 UIU/ML (ref 0.3–5)
VITAMIN D 25-HYDROXY: 9.4 NG/ML
WBC # BLD: 7.1 K/UL (ref 3.5–11.3)

## 2022-05-12 PROCEDURE — 83036 HEMOGLOBIN GLYCOSYLATED A1C: CPT

## 2022-05-12 PROCEDURE — 36415 COLL VENOUS BLD VENIPUNCTURE: CPT

## 2022-05-12 PROCEDURE — 80177 DRUG SCRN QUAN LEVETIRACETAM: CPT

## 2022-05-12 PROCEDURE — 84443 ASSAY THYROID STIM HORMONE: CPT

## 2022-05-12 PROCEDURE — 80061 LIPID PANEL: CPT

## 2022-05-12 PROCEDURE — 80053 COMPREHEN METABOLIC PANEL: CPT

## 2022-05-12 PROCEDURE — 82306 VITAMIN D 25 HYDROXY: CPT

## 2022-05-12 PROCEDURE — 85025 COMPLETE CBC W/AUTO DIFF WBC: CPT

## 2022-05-14 LAB
REASON FOR REJECTION: NORMAL
ZZ NTE CLEAN UP: ORDERED TEST: NORMAL
ZZ NTE WITH NAME CLEAN UP: SPECIMEN SOURCE: NORMAL

## 2022-05-15 ENCOUNTER — TELEPHONE (OUTPATIENT)
Dept: FAMILY MEDICINE CLINIC | Age: 27
End: 2022-05-15

## 2022-05-15 DIAGNOSIS — E55.9 VITAMIN D DEFICIENCY: ICD-10-CM

## 2022-05-15 DIAGNOSIS — E87.6 CHRONICALLY LOW SERUM POTASSIUM: Primary | ICD-10-CM

## 2022-05-15 RX ORDER — POTASSIUM CHLORIDE 750 MG/1
10 TABLET, EXTENDED RELEASE ORAL DAILY
Qty: 90 TABLET | Refills: 1 | Status: SHIPPED | OUTPATIENT
Start: 2022-05-15 | End: 2022-05-17

## 2022-05-15 NOTE — TELEPHONE ENCOUNTER
Please let the patient know of the recent results. These can also be discussed further on the future visits. Patient's blood count is WNL    Lab Results   Component Value Date    WBC 7.1 05/12/2022    HGB 16.0 05/12/2022    HCT 48.0 05/12/2022    MCV 87.6 05/12/2022     05/12/2022    LYMPHOPCT 26 05/12/2022    RBC 5.48 05/12/2022    MCH 29.2 05/12/2022    MCHC 33.3 05/12/2022    RDW 13.2 05/12/2022       Patient's thyroid function is WNL    Lab Results   Component Value Date    TSH 1.85 05/12/2022       Patient's kidney function is WNL  Potassium level is low. I sent a prescription for potassium for him to take on a daily basis. Patient's liver function is WNL  . Lab Results   Component Value Date     05/12/2022    K 3.3 (L) 05/12/2022     05/12/2022    CO2 26 05/12/2022    BUN 14 05/12/2022    CREATININE 0.91 05/12/2022    GLUCOSE 96 05/12/2022    CALCIUM 9.6 05/12/2022    PROT 7.7 05/12/2022    LABALBU 4.8 05/12/2022    BILITOT 0.49 05/12/2022    ALKPHOS 83 05/12/2022    AST 30 05/12/2022    ALT 37 05/12/2022    LABGLOM >60 05/12/2022    GFRAA >60 05/12/2022    GLOB NOT REPORTED 08/22/2015     Diabetes screening WNL    Lab Results   Component Value Date/Time    LABA1C 5.2 05/12/2022 05:03 PM        Vitamin D Screening  Please let the patient know that the patient's recent vitamin d level was insufficient. I will send an oral supplementation that needs to be taken weekly. We will be checking his levels on an annual basis from now on. We can discuss this condition further on her next visit. Lab Results   Component Value Date/Time    VITD25 9.4 (L) 05/12/2022 05:03 PM         Patient's lipids test results Please let the patient know that his  Good cholesterol level was low Please advise the patient to:  Cut down on red meats and trans fats in their diet. Increase physical activity.   Add some regular exercise at least 3 times a week on their routine  Try to lose weight to help improve their cholesterol levels.      Lab Results   Component Value Date/Time    CHOLFAST 161 04/01/2021 12:44 PM    CHOL 156 05/12/2022 05:03 PM    HDL 40 (L) 05/12/2022 05:03 PM    LDLCHOLESTEROL 90 05/12/2022 05:03 PM    TRIG 128 05/12/2022 05:03 PM    CHOLHDLRATIO 3.9 05/12/2022 05:03 PM    VLDL NOT REPORTED 04/01/2021 12:44 PM

## 2022-05-17 RX ORDER — POTASSIUM CHLORIDE 750 MG/1
10 TABLET, EXTENDED RELEASE ORAL DAILY
Qty: 60 TABLET | Refills: 3 | Status: SHIPPED | OUTPATIENT
Start: 2022-05-17 | End: 2022-06-06 | Stop reason: SDUPTHER

## 2022-05-17 NOTE — TELEPHONE ENCOUNTER
Can Vit - D and Potassium - be order as liquid  form ? Mom states patient has hard time taking pills .

## 2022-05-17 NOTE — TELEPHONE ENCOUNTER
Patient mother was informed of lab results . Was informed that new medication was sent to the pharmacy .

## 2022-05-19 ENCOUNTER — TELEPHONE (OUTPATIENT)
Dept: FAMILY MEDICINE CLINIC | Age: 27
End: 2022-05-19

## 2022-05-19 NOTE — TELEPHONE ENCOUNTER
Mom called because pharmacy has two separate scripts for Vitamin D (a weekly and a daily) and they need to know which one he should be taking. Please advise.

## 2022-05-24 ENCOUNTER — HOSPITAL ENCOUNTER (OUTPATIENT)
Age: 27
Setting detail: SPECIMEN
Discharge: HOME | End: 2022-05-24
Payer: COMMERCIAL

## 2022-05-24 DIAGNOSIS — R73.9 HYPERGLYCEMIA: ICD-10-CM

## 2022-05-24 DIAGNOSIS — R63.5 WEIGHT GAIN: ICD-10-CM

## 2022-05-24 LAB
BILIRUBIN URINE: NEGATIVE
COLOR: YELLOW
COMMENT UA: ABNORMAL
GLUCOSE URINE: NEGATIVE
KETONES, URINE: NEGATIVE
LEUKOCYTE ESTERASE, URINE: NEGATIVE
NITRITE, URINE: NEGATIVE
PH UA: 6 (ref 5–8)
PROTEIN UA: NEGATIVE
SPECIFIC GRAVITY UA: 1.03 (ref 1–1.03)
TURBIDITY: CLEAR
URINE HGB: NEGATIVE
UROBILINOGEN, URINE: NORMAL

## 2022-05-24 PROCEDURE — 81003 URINALYSIS AUTO W/O SCOPE: CPT

## 2022-05-27 ENCOUNTER — TELEPHONE (OUTPATIENT)
Dept: FAMILY MEDICINE CLINIC | Age: 27
End: 2022-05-27

## 2022-05-27 NOTE — TELEPHONE ENCOUNTER
Mom called in and wanted to go over results for urine test. She also had a question regarding Oxcarbazepine labs, mom thought you mentioned at the last visit that you wanted to check this. Please advise.

## 2022-05-27 NOTE — TELEPHONE ENCOUNTER
It looks like keppra level was not done by labs. I am unable to call since I am busy seeing patients. I am not sure what happened. Can we check with labs? If she has lots of questions we can always set up a virtual visit.

## 2022-05-27 NOTE — TELEPHONE ENCOUNTER
Reached out to [facility] lab, they stated collection went over to [facility]. V's, was transferred someone answered, then put me on hold, then phone started ringing and kept ringing,

## 2022-06-02 DIAGNOSIS — E78.2 MIXED HYPERLIPIDEMIA: ICD-10-CM

## 2022-06-02 RX ORDER — ATORVASTATIN CALCIUM 20 MG/1
TABLET, FILM COATED ORAL
Qty: 90 TABLET | Refills: 2 | Status: SHIPPED | OUTPATIENT
Start: 2022-06-02

## 2022-06-06 ENCOUNTER — TELEPHONE (OUTPATIENT)
Dept: FAMILY MEDICINE CLINIC | Age: 27
End: 2022-06-06

## 2022-06-06 DIAGNOSIS — G40.909 SEIZURE DISORDER (HCC): ICD-10-CM

## 2022-06-06 DIAGNOSIS — E87.6 CHRONICALLY LOW SERUM POTASSIUM: ICD-10-CM

## 2022-06-06 DIAGNOSIS — Z51.81 MEDICATION MONITORING ENCOUNTER: Primary | ICD-10-CM

## 2022-06-06 RX ORDER — POTASSIUM CHLORIDE 750 MG/1
10 TABLET, EXTENDED RELEASE ORAL 2 TIMES DAILY
Qty: 60 TABLET | Refills: 3 | Status: SHIPPED | OUTPATIENT
Start: 2022-06-06

## 2022-06-06 NOTE — TELEPHONE ENCOUNTER
Pt called and was given lab results. Also stated that she did see as well on my chart verbalized understanding.

## 2022-06-06 NOTE — TELEPHONE ENCOUNTER
His mother requested results, in a [employer] message in her chart. Please call mom with results  Makayla Rebollar did place a note for results as telephone encounter       Urine test shows it is concentrated, to drink more water 8 x 8 ounce glasses of water every day    Vitamin D very low 9.4, to take the vitamin D which ran out recently refilled, to take it every day with food    Thyroid function within normal limits    Lipids within normal limits except low HDL, needs to eat more fruits and vegetables, and avoid fried food and try to exercise    Hemoglobin A1c 5.2, normal, no prediabetes    CMP shows low potassium 3.3, otherwise normal liver and kidney function and blood glucose, he is taking potassium 10 daily, to increase potassium to 20, I will send a new prescription to the pharmacy    CBC within normal limits    Specifically because mom asked, Keppra level order is in the computer from 5/3/2022 but the lab did not do it, and there is a specimen rejection said unable to perform testing. Could go back to the lab at any time to have [address] level done    I will place a new order for potassium level, magnesium, Keppra to do not fasting, on Thursday morning    I will suggest him to see a nephrologist for low potassium which has been low for a while if his mom agrees         Diagnosis Orders   1. Medication monitoring encounter  Levetiracetam Level   2. Chronically low serum potassium  potassium chloride (KLOR-CON M) 10 MEQ extended release tablet    Potassium    Magnesium   3.  Seizure disorder [facility])  Levetiracetam Level        Future Appointments   Date Time Provider Cathy Thacker   9/6/2022 10:00 AM Coreen Mcdonald, APRN - CNP [facility]         BP Readings from Last 3 Encounters:   05/03/22 (!) 140/94   01/03/22 138/88   10/20/21 129/89

## 2022-09-02 ASSESSMENT — ENCOUNTER SYMPTOMS
RESPIRATORY NEGATIVE: 1
WHEEZING: 0
ABDOMINAL PAIN: 0
ROS SKIN COMMENTS: ACNE
RECTAL PAIN: 1
SHORTNESS OF BREATH: 0

## 2022-09-03 NOTE — PROGRESS NOTES
[facility] PHYSICIANS  [facility] PHYSICIANS [facility]  [address]  [address] [facility] [address]  Dept: [address] [facility] (:  1995) is a 32 y.o. male. Patient is here for evaluation of the following chief complaint(s):  Chief Complaint   Patient presents with    Hypertension          SUBJECTIVE/OBJECTIVE:  HPI  Krissy Woods is a 32 y.o. male patient. Patient is an established patient of [facility]. Patient came in with his mom. Patient is nonverbal.  Patient has a known history of hypertension, hyperlipoidemia, seizure, autistic disorder, acne vulgaris, and obesity. [place] SEIZURE  Patient has a known history of seizure. Patient have not had a seizure since seen by me last time. He has had some grand mal seizures since he was 15year old. Patient is established with a neurologist he is seeing. Dr. Caleb Gomez was switched to-Dr Gabo Huang every 6 months. .  Patient is being seen on a regular basis. Patient had an EEG about 8 years ago. Patient mother noted that he had a 72-hour EEG but was told that the EEG did not show any seizure. Patient is currently on Keppra, Risperidone,  Oxtellar. Patient have not had any Keppra level done. Patient's last seizure was over a year ago. HYPERTENSION  Krissy Woods has a well controlled hypertension. he is currently on hydrochlorothiazide. Patient's most recent BP in the office was stable. he reports stable BP readings at home. Patient denies any adverse reaction to this therapy. he denies any CP, SOB, HA, or palpitations. Patient admits to exercising and adheres to low salt diet. No history of organ damage due to condition noted. Lab Results   Component Value Date/Time    CREATININE 0.91 2022 05:03 PM     BP Readings from Last 3 Encounters:   22 122/84   22 (!) 140/94   22 138/88     HYPERLIPIDEMIA  Krissy Woods is currently on atorvastatin (Lipitor). Patient denies adverse reaction to this medication. Compliance with treatment thus far has been good. The patient is not known to have coexisting coronary artery disease. The CVD Risk score (MARISEL'Agostino, et al., 2008) failed to calculate for the following reasons: The 2008 CVD risk score is only valid for ages 27 to 76  Lab Results   Component Value Date/Time    CHOLFAST 161 04/01/2021 12:44 PM    CHOL 156 05/12/2022 05:03 PM    HDL 40 (L) 05/12/2022 05:03 PM    LDLCHOLESTEROL 90 05/12/2022 05:03 PM    TRIGLYCFAST 81 04/01/2021 12:44 PM    CHOLHDLRATIO 3.9 05/12/2022 05:03 PM    TRIG 128 05/12/2022 05:03 PM    VLDL NOT REPORTED 04/01/2021 12:44 PM     ACNE  Irina Aleman is a 32 y.o. male patient with acne vulgaris. Patient currently on  benzoyl peroxide. Reports fair success no worsening. .     AUTISTIC DISORDER/ PHYSICAL/ NON VERBAL  Irina Aleman 's mother reported that patient can get somewhat physical.  Patient has been calm ever since seen by me. I have never acted any different. Continue to take the same therapy and has been stable since patient continues to pay a lot of attention to his phone and to his due Fitbit. Have been doing a lot of walking and exercising continues to lose weight. Established with [employer]   He is currently on risperidone doing well. Patient is able to follow directions fairly well. Patient is able to respond yes or no to some questions. .         No flowsheet data found. DEPRESSION SCREENING: Negative  PHQ Scores 5/3/2022 1/3/2022 8/26/2021 8/5/2020 2/21/2019 8/7/2017   PHQ2 Score 0 0 0 0 0 0   PHQ9 Score 0 0 0 0 0 0     WEIGHT-- lost 4 lbs  Joined the [facility]  Patient's BMI is Body mass index is 44.34 kg/m². kg/m2. BMI is increasing. Patient understands that this condition increases the patient's risk for chronic conditions.   Wt Readings from Last 3 Encounters:   09/06/22 (!) 309 lb (140.2 kg)   05/03/22 (!) 313 lb 12.8 oz (142.3 kg)   01/03/22 (!) 316 lb (143.3 kg)     VITAL SIGNS:  Vitals:    09/06/22 0942   BP: 122/84   Pulse: 78   Resp: 18   Temp: 98 °F (36.7 °C)   SpO2: 97%   Weight: (!) 309 lb (140.2 kg)   Height: 5' 10\" (1.778 m)     Estimated body mass index is 44.34 kg/m² as calculated from the following:    Height as of this encounter: 5' 10\" (1.778 m). Weight as of this encounter: 309 lb (140.2 kg). Review of Systems   Constitutional:  Positive for unexpected weight change (losing weight). Negative for chills and fever. HENT: Negative. Respiratory: Negative. Negative for chest tightness, shortness of breath and wheezing. Cardiovascular: Negative. Negative for chest pain and palpitations. Gastrointestinal:  Positive for rectal pain. Negative for abdominal distention and abdominal pain. Endocrine: Negative. Musculoskeletal:  Negative for arthralgias. Skin:  Negative for color change. acne   Neurological:  Positive for seizures. Negative for headaches. Psychiatric/Behavioral:  Positive for behavioral problems and dysphoric mood. Negative for sleep disturbance and suicidal ideas. The patient is not nervous/anxious. Physical Exam  Vitals and nursing note reviewed. HENT:      Head: Normocephalic. Nose: Nose normal.      Mouth/Throat:      Comments: Short neck  Cardiovascular:      Rate and Rhythm: Normal rate and regular rhythm. Pulmonary:      Effort: Pulmonary effort is normal.   Abdominal:      General: Abdomen is protuberant. There is no distension. Comments: obese   Skin:     General: Skin is warm and dry. Findings: Acne (facial) present. Rash is not macular or papular. Neurological:      Mental Status: He is alert. Psychiatric:         Mood and Affect: Affect is flat. Speech: Speech is delayed. Behavior: Behavior is not withdrawn. Thought Content: Thought content does not include suicidal ideation.       Comments: Poor eye contact, non verbal       MEDICAL HISTORY      Diagnosis Date    Autism     Mental developmental delay 8/9/2012    Seizure disorder ([facility] 8/9/2012    Seizures ([facility]       MEDICATIONS  Prior to Visit Medications    Medication Sig Taking? Authorizing Provider   potassium chloride (KLOR-CON M) 10 MEQ extended release tablet Take 1 tablet by mouth 2 times daily Dose increased 6/6/2022 Yes Candie Stevens MD   atorvastatin (LIPITOR) 20 MG tablet TAKE 1 TABLET BY MOUTH EVERY DAY Yes ROBERTO Lucia CNP   Cholecalciferol 25 MCG /0.028ML LIQD Take 100 mLs by mouth daily Yes ROBERTO Lucia CNP   hydroCHLOROthiazide (HYDRODIURIL) 12.5 MG tablet TAKE (1) TABLET BY MOUTH ONCE DAILY Yes ROBERTO Lucia CNP   risperiDONE (RISPERDAL) 1 MG tablet Take 1 tablet by mouth nightly Yes Historical Provider, MD   Benzoyl Peroxide (BENZOYL PEROXIDE) 10 % external wash APPLY TOPICALLY TWICE DAILY Yes ROBERTO Chaudhari CNP   levETIRAcetam (KEPPRA XR) 500 MG TB24 extended release tablet 500 mg 5 times daily  Yes Historical Provider, MD   OXTELLAR  MG TB24 Take 600 mg by mouth 4 times daily  Yes Historical Provider, MD   Melatonin 5 MG TABS tablet Take 1.5 mg by mouth daily Takes 1/2 of 5mg tab= 2.5mg daily  Yes Historical Provider, MD   Pyridoxine HCl (VITAMIN B-6) 100 MG tablet Take 100 mg by mouth daily. Yes Historical Provider, MD     Controlled Substance Monitoring:  Acute and Chronic Pain Monitoring:   No flowsheet data found. ASSESSMENT/PLAN:  1. Essential hypertension  Stable  Continue current therapy. DISCUSSED AND ADVISED TO:  Cut down on your salt intake. Cut down on caffeinated drinks, sports drinks. Instructed to check BP at home regularly. Report for any chest pains, shortness of breath, headaches, and lightheadedness. Call the office if your blood pressure continue to be higher than 140/90 or 90/50.      2. Mixed hyperlipidemia  Stable  Continue therapy. Advised to decrease the consumption of red meats, fried foods, trans fats, sweets, sugary beverages.    Advised to increase fish, vegetables, and fruits consumption. Advised to add fiber or OTC supplements in diet. Discussed weight loss which will result in improvement of lipids levels. Advised to increase daily physical activities and add regular exercises. 3. Seizure disorder ([address]  Stable  Continue current therapy  Encouraged to follow up with the specialist  Continue to monitor      4. Autistic disorder  Stable  Encouraged to follow up with the specialist  Continue current therapy  Continue to monitor      5. Acne vulgaris  Stable  ADVISED TO:  Wash face at least twice a day  Use prescription cleansers  Report for worsening redness, swelling, and increasing numbers of pimples. 6. Developmental non-verbal disorder  Stable  Encouraged to follow up with the specialist  Continue current therapy  Continue to monitor  Encourage to lose weight    7. Morbid obesity with BMI of 45.0-49.9, adult (HCC)  Failure to Improve  BMI decreasing  DISCUSSED AND ADVISED TO:  Eat a low-fat and low carbohydrates diet. Avoid fried foods especially fast food. Choose healthier options for snacks. Have 5-6 servings of fruits and vegetables per day. Cut down on eating processed food. Add 30 minutes to 1 hour aerobic exercise for 3-4 days a week. Return in about 4 months (around 1/6/2023) for Chronic conditions, 30mins. This note was completed by using the assistance of a speech-recognition program. However, inadvertent computerized transcription errors may be present. Although every effort was made to ensure accuracy, no guarantees can be provided that every mistake has been identified and corrected by editing.   Electronically signed by ROBERTO Campbell CNP on 5/2/22 at 5:20 PM EDT     --ROBERTO Campbell CNP

## 2022-09-05 DIAGNOSIS — E55.9 VITAMIN D DEFICIENCY: ICD-10-CM

## 2022-09-06 ENCOUNTER — OFFICE VISIT (OUTPATIENT)
Dept: FAMILY MEDICINE CLINIC | Age: 27
End: 2022-09-06
Payer: COMMERCIAL

## 2022-09-06 VITALS
WEIGHT: 309 LBS | OXYGEN SATURATION: 97 % | DIASTOLIC BLOOD PRESSURE: 84 MMHG | TEMPERATURE: 98 F | HEIGHT: 70 IN | HEART RATE: 78 BPM | SYSTOLIC BLOOD PRESSURE: 122 MMHG | BODY MASS INDEX: 44.24 KG/M2 | RESPIRATION RATE: 18 BRPM

## 2022-09-06 DIAGNOSIS — L70.0 ACNE VULGARIS: ICD-10-CM

## 2022-09-06 DIAGNOSIS — E66.01 MORBID OBESITY WITH BMI OF 45.0-49.9, ADULT (HCC): ICD-10-CM

## 2022-09-06 DIAGNOSIS — G40.909 SEIZURE DISORDER (HCC): ICD-10-CM

## 2022-09-06 DIAGNOSIS — F84.0 AUTISTIC DISORDER: ICD-10-CM

## 2022-09-06 DIAGNOSIS — I10 ESSENTIAL HYPERTENSION: Primary | ICD-10-CM

## 2022-09-06 DIAGNOSIS — F81.89 DEVELOPMENTAL NON-VERBAL DISORDER: ICD-10-CM

## 2022-09-06 DIAGNOSIS — E78.2 MIXED HYPERLIPIDEMIA: ICD-10-CM

## 2022-09-06 PROCEDURE — 99214 OFFICE O/P EST MOD 30 MIN: CPT | Performed by: FAMILY MEDICINE

## 2022-09-06 PROCEDURE — G8427 DOCREV CUR MEDS BY ELIG CLIN: HCPCS | Performed by: FAMILY MEDICINE

## 2022-09-06 PROCEDURE — G8417 CALC BMI ABV UP PARAM F/U: HCPCS | Performed by: FAMILY MEDICINE

## 2022-09-06 PROCEDURE — 1036F TOBACCO NON-USER: CPT | Performed by: FAMILY MEDICINE

## 2022-09-06 RX ORDER — METHOCARBAMOL 750 MG/1
TABLET ORAL
Qty: 12 CAPSULE | Refills: 0 | OUTPATIENT
Start: 2022-09-06

## 2022-09-06 ASSESSMENT — ENCOUNTER SYMPTOMS
COLOR CHANGE: 0
ABDOMINAL DISTENTION: 0
CHEST TIGHTNESS: 0

## 2022-09-06 NOTE — PATIENT INSTRUCTIONS
New Updates for [id number]/ [facility] LUIS FERNANDO    Thank you for choosing US to give you the best care! [facility] is always trying to think of new ways to help their patients. We are asking all patients to try out the new digital registration that is now available through your [facility] account or the new LUIS FERNANDO, [facility]. Via the luis fernando you're now able to update your personal and registration information prior to your upcoming appointment. This will save you time once you arrive at the office to check-in, not to mention your information remains safe!! Many other perks come from signing up for an account, such as:  Requesting refills  Scheduling an appointment  Completing an E-Visit  Sending a message to the office/provider  Having access to your medication list  Paying your bill/copay prior to your appointment  Scheduling your yearly mammogram  Review your test results    If you are not familiar with [facility] or the [facility] LUIS FERNANDO, please ask one of us and we will be happy to answer any questions or help you set-up your account.       Your [address] office,  a[place]ryl

## 2022-09-13 DIAGNOSIS — I10 ESSENTIAL HYPERTENSION: ICD-10-CM

## 2022-09-14 DIAGNOSIS — I10 ESSENTIAL HYPERTENSION: ICD-10-CM

## 2022-09-14 RX ORDER — HYDROCHLOROTHIAZIDE 12.5 MG/1
TABLET ORAL
Qty: 30 TABLET | Refills: 2 | Status: SHIPPED | OUTPATIENT
Start: 2022-09-14 | End: 2022-09-15

## 2022-09-15 DIAGNOSIS — I10 ESSENTIAL HYPERTENSION: ICD-10-CM

## 2022-09-15 RX ORDER — HYDROCHLOROTHIAZIDE 12.5 MG/1
TABLET ORAL
Qty: 30 TABLET | Refills: 1 | Status: SHIPPED | OUTPATIENT
Start: 2022-09-15 | End: 2022-09-22

## 2022-09-15 NOTE — TELEPHONE ENCOUNTER
Kavitha Wheatley is calling to request a refill on the following medication(s):    Medication Request:  Requested Prescriptions     Pending Prescriptions Disp Refills    hydroCHLOROthiazide (HYDRODIURIL) 12.5 MG tablet [Pharmacy Med Name: HCTZ 12.5MG TAB 12.5 Tablet] 30 tablet 10     Sig: TAKE ONE (1) TABLET BY MOUTH ONCE DAILY       Last Visit Date (If Applicable):  8/6/6093    Next Visit Date:    1/6/2023

## 2022-09-16 RX ORDER — HYDROCHLOROTHIAZIDE 12.5 MG/1
TABLET ORAL
Qty: 30 TABLET | Refills: 10 | OUTPATIENT
Start: 2022-09-16

## 2022-09-21 DIAGNOSIS — I10 ESSENTIAL HYPERTENSION: ICD-10-CM

## 2022-09-22 RX ORDER — HYDROCHLOROTHIAZIDE 12.5 MG/1
TABLET ORAL
Qty: 30 TABLET | Refills: 2 | Status: SHIPPED | OUTPATIENT
Start: 2022-09-22

## 2022-09-29 ENCOUNTER — TELEPHONE (OUTPATIENT)
Dept: FAMILY MEDICINE CLINIC | Age: 27
End: 2022-09-29

## 2022-09-29 PROBLEM — Z91.89: Status: ACTIVE | Noted: 2022-09-29

## 2022-09-29 NOTE — TELEPHONE ENCOUNTER
Patient mother called into office requesting an update on paperwork that was dropped in 9/27/2022. She state this is time sensitive and it is due 10/4/2022.      Last office visit 9/6/2022

## 2022-11-09 DIAGNOSIS — E87.6 CHRONICALLY LOW SERUM POTASSIUM: ICD-10-CM

## 2022-11-09 RX ORDER — POTASSIUM CHLORIDE 750 MG/1
TABLET, EXTENDED RELEASE ORAL
Qty: 60 TABLET | Refills: 1 | Status: SHIPPED | OUTPATIENT
Start: 2022-11-09 | End: 2023-01-12 | Stop reason: SDUPTHER

## 2022-12-20 ENCOUNTER — TELEPHONE (OUTPATIENT)
Dept: FAMILY MEDICINE CLINIC | Age: 27
End: 2022-12-20

## 2022-12-20 NOTE — TELEPHONE ENCOUNTER
LVM advising pt appt needs to be rescheduled due to provider leaving practice.  Message sent via [address]

## 2023-01-12 ENCOUNTER — OFFICE VISIT (OUTPATIENT)
Dept: FAMILY MEDICINE CLINIC | Age: 28
End: 2023-01-12

## 2023-01-12 VITALS
HEART RATE: 86 BPM | HEIGHT: 70 IN | RESPIRATION RATE: 22 BRPM | DIASTOLIC BLOOD PRESSURE: 82 MMHG | SYSTOLIC BLOOD PRESSURE: 126 MMHG | WEIGHT: 307.8 LBS | OXYGEN SATURATION: 96 % | BODY MASS INDEX: 44.07 KG/M2

## 2023-01-12 DIAGNOSIS — Z76.89 ENCOUNTER TO ESTABLISH CARE: Primary | ICD-10-CM

## 2023-01-12 DIAGNOSIS — E55.9 VITAMIN D DEFICIENCY: ICD-10-CM

## 2023-01-12 DIAGNOSIS — E87.6 CHRONICALLY LOW SERUM POTASSIUM: ICD-10-CM

## 2023-01-12 DIAGNOSIS — E87.6 HYPOKALEMIA: ICD-10-CM

## 2023-01-12 DIAGNOSIS — I10 ESSENTIAL HYPERTENSION: ICD-10-CM

## 2023-01-12 DIAGNOSIS — E78.2 MIXED HYPERLIPIDEMIA: ICD-10-CM

## 2023-01-12 RX ORDER — ATORVASTATIN CALCIUM 20 MG/1
TABLET, FILM COATED ORAL
Qty: 90 TABLET | Refills: 2 | Status: SHIPPED | OUTPATIENT
Start: 2023-01-12

## 2023-01-12 RX ORDER — HYDROCHLOROTHIAZIDE 12.5 MG/1
TABLET ORAL
Qty: 30 TABLET | Refills: 2 | Status: SHIPPED | OUTPATIENT
Start: 2023-01-12

## 2023-01-12 RX ORDER — POTASSIUM CHLORIDE 750 MG/1
TABLET, EXTENDED RELEASE ORAL
Qty: 60 TABLET | Refills: 1 | Status: SHIPPED | OUTPATIENT
Start: 2023-01-12

## 2023-01-12 SDOH — ECONOMIC STABILITY: FOOD INSECURITY: WITHIN THE PAST 12 MONTHS, THE FOOD YOU BOUGHT JUST DIDN'T LAST AND YOU DIDN'T HAVE MONEY TO GET MORE.: NEVER TRUE

## 2023-01-12 SDOH — ECONOMIC STABILITY: FOOD INSECURITY: WITHIN THE PAST 12 MONTHS, YOU WORRIED THAT YOUR FOOD WOULD RUN OUT BEFORE YOU GOT MONEY TO BUY MORE.: NEVER TRUE

## 2023-01-12 ASSESSMENT — SOCIAL DETERMINANTS OF HEALTH (SDOH): HOW HARD IS IT FOR YOU TO PAY FOR THE VERY BASICS LIKE FOOD, HOUSING, MEDICAL CARE, AND HEATING?: NOT HARD AT ALL

## 2023-01-12 ASSESSMENT — PATIENT HEALTH QUESTIONNAIRE - PHQ9: DEPRESSION UNABLE TO ASSESS: PT REFUSES
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
## 2023-01-13 ENCOUNTER — TELEPHONE (OUTPATIENT)
Dept: FAMILY MEDICINE CLINIC | Age: 28
End: 2023-01-13

## 2023-01-13 DIAGNOSIS — E55.9 VITAMIN D DEFICIENCY: Primary | ICD-10-CM

## 2023-01-13 NOTE — TELEPHONE ENCOUNTER
[facility] called and states that the Vitamin D is for an infant. I tried to explain that we just refilled a prior script that was recently prescribed. Please review script.

## 2023-01-13 NOTE — TELEPHONE ENCOUNTER
Weekly wafer of vitamin D sent to pharmacy. Pharmacy will not fill liquid vitamin D d/t large quantity. Let mom know he can just chew or dissolve in water and drink.

## 2023-01-15 ASSESSMENT — ENCOUNTER SYMPTOMS
EYE PAIN: 0
CONSTIPATION: 0
CHEST TIGHTNESS: 0
ABDOMINAL PAIN: 0
NAUSEA: 0
SINUS PRESSURE: 0
COLOR CHANGE: 0
SINUS PAIN: 0
VOMITING: 0
BACK PAIN: 0
SHORTNESS OF BREATH: 0
DIARRHEA: 0

## 2023-03-25 DIAGNOSIS — I10 ESSENTIAL HYPERTENSION: ICD-10-CM

## 2023-03-25 DIAGNOSIS — E87.6 CHRONICALLY LOW SERUM POTASSIUM: ICD-10-CM

## 2023-03-27 RX ORDER — HYDROCHLOROTHIAZIDE 12.5 MG/1
TABLET ORAL
Qty: 30 TABLET | Refills: 5 | Status: SHIPPED | OUTPATIENT
Start: 2023-03-27

## 2023-03-27 RX ORDER — POTASSIUM CHLORIDE 750 MG/1
TABLET, EXTENDED RELEASE ORAL
Qty: 60 TABLET | Refills: 5 | Status: SHIPPED | OUTPATIENT
Start: 2023-03-27
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
## 2023-04-05 ENCOUNTER — HOSPITAL ENCOUNTER (OUTPATIENT)
Age: 28
Setting detail: SPECIMEN
Discharge: HOME | End: 2023-04-05

## 2023-04-05 DIAGNOSIS — E87.6 HYPOKALEMIA: ICD-10-CM

## 2023-04-05 DIAGNOSIS — E87.6 CHRONICALLY LOW SERUM POTASSIUM: Primary | ICD-10-CM

## 2023-04-05 LAB — POTASSIUM SERPL-SCNC: 3.4 MMOL/L (ref 3.7–5.3)

## 2023-04-05 RX ORDER — POTASSIUM CHLORIDE 20 MEQ/1
20 TABLET, EXTENDED RELEASE ORAL DAILY
Qty: 30 TABLET | Refills: 0 | Status: SHIPPED | OUTPATIENT
Start: 2023-04-05

## 2023-04-08 NOTE — PROGRESS NOTES
Received page from [facility]  Mom had questions about new Rx for potassium supplement. She has been crushing 10meq BID. Discussed I would try and either find an IR tablet option or liquid option with pharmacy so that he would better absorb med.

## 2023-04-27 ENCOUNTER — HOSPITAL ENCOUNTER (OUTPATIENT)
Age: 28
Setting detail: SPECIMEN
Discharge: HOME | End: 2023-04-27

## 2023-04-27 DIAGNOSIS — E55.9 VITAMIN D DEFICIENCY: ICD-10-CM

## 2023-04-27 DIAGNOSIS — E87.6 HYPOKALEMIA: Primary | ICD-10-CM

## 2023-04-27 DIAGNOSIS — E87.6 HYPOKALEMIA: ICD-10-CM

## 2023-04-27 LAB
25(OH)D3 SERPL-MCNC: 30 NG/ML
POTASSIUM SERPL-SCNC: 3.9 MMOL/L (ref 3.7–5.3)

## 2023-07-07 DIAGNOSIS — E55.9 VITAMIN D DEFICIENCY: ICD-10-CM

## 2023-07-07 RX ORDER — PYRIDOXINE HCL (VITAMIN B6) 25 MG
LOZENGE ON A HANDLE MUCOUS MEMBRANE
Qty: 12 WAFER | Refills: 0 | Status: SHIPPED | OUTPATIENT
Start: 2023-07-07

## 2023-07-07 RX ORDER — METHOCARBAMOL 750 MG/1
TABLET ORAL
Qty: 12 CAPSULE | Refills: 0 | OUTPATIENT
Start: 2023-07-07

## 2023-07-07 NOTE — TELEPHONE ENCOUNTER
Please Approve or Refuse.   Send to Pharmacy per Pt's Request:      Next Visit Date:  Visit date not found   Last Visit Date: 9/6/2022    Hemoglobin A1C (%)   Date Value   05/12/2022 5.2   08/26/2021 4.9   08/22/2015 5.2             ( goal A1C is < 7)   BP Readings from Last 3 Encounters:   04/13/23 130/82   01/12/23 126/82   09/06/22 122/84          (goal 120/80)  BUN   Date Value Ref Range Status   05/12/2022 14 6 - 20 mg/dL Final     Creatinine   Date Value Ref Range Status   05/12/2022 0.91 0.70 - 1.20 mg/dL Final     Potassium   Date Value Ref Range Status   04/27/2023 3.9 3.7 - 5.3 mmol/L Final

## 2023-08-08 DIAGNOSIS — I10 ESSENTIAL HYPERTENSION: ICD-10-CM

## 2023-08-09 RX ORDER — HYDROCHLOROTHIAZIDE 12.5 MG/1
TABLET ORAL
Qty: 90 TABLET | Refills: 1 | Status: SHIPPED | OUTPATIENT
Start: 2023-08-09
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
## 2023-08-29 DIAGNOSIS — E87.6 HYPOKALEMIA: ICD-10-CM
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
## 2023-08-30 RX ORDER — POTASSIUM CHLORIDE 20MEQ/15ML
20 LIQUID (ML) ORAL DAILY
Qty: 900 ML | Refills: 1 | Status: SHIPPED | OUTPATIENT
Start: 2023-08-30

## 2023-10-13 ENCOUNTER — OFFICE VISIT (OUTPATIENT)
Dept: FAMILY MEDICINE CLINIC | Age: 28
End: 2023-10-13
Payer: COMMERCIAL

## 2023-10-13 VITALS
HEIGHT: 70 IN | OXYGEN SATURATION: 94 % | WEIGHT: 286.8 LBS | DIASTOLIC BLOOD PRESSURE: 84 MMHG | HEART RATE: 72 BPM | RESPIRATION RATE: 20 BRPM | BODY MASS INDEX: 41.06 KG/M2 | SYSTOLIC BLOOD PRESSURE: 130 MMHG

## 2023-10-13 DIAGNOSIS — E78.2 MIXED HYPERLIPIDEMIA: ICD-10-CM

## 2023-10-13 DIAGNOSIS — E66.01 CLASS 3 SEVERE OBESITY WITH SERIOUS COMORBIDITY AND BODY MASS INDEX (BMI) OF 40.0 TO 44.9 IN ADULT, UNSPECIFIED OBESITY TYPE (HCC): ICD-10-CM

## 2023-10-13 DIAGNOSIS — G40.909 SEIZURE DISORDER (HCC): ICD-10-CM

## 2023-10-13 DIAGNOSIS — I10 ESSENTIAL HYPERTENSION: Primary | ICD-10-CM

## 2023-10-13 PROCEDURE — 1036F TOBACCO NON-USER: CPT | Performed by: NURSE PRACTITIONER

## 2023-10-13 PROCEDURE — G8417 CALC BMI ABV UP PARAM F/U: HCPCS | Performed by: NURSE PRACTITIONER

## 2023-10-13 PROCEDURE — 3079F DIAST BP 80-89 MM HG: CPT | Performed by: NURSE PRACTITIONER

## 2023-10-13 PROCEDURE — G8484 FLU IMMUNIZE NO ADMIN: HCPCS | Performed by: NURSE PRACTITIONER

## 2023-10-13 PROCEDURE — 3075F SYST BP GE 130 - 139MM HG: CPT | Performed by: NURSE PRACTITIONER

## 2023-10-13 PROCEDURE — G8427 DOCREV CUR MEDS BY ELIG CLIN: HCPCS | Performed by: NURSE PRACTITIONER

## 2023-10-13 PROCEDURE — 99214 OFFICE O/P EST MOD 30 MIN: CPT | Performed by: NURSE PRACTITIONER

## 2023-10-13 RX ORDER — ATORVASTATIN CALCIUM 20 MG/1
TABLET, FILM COATED ORAL
Qty: 90 TABLET | Refills: 2 | Status: SHIPPED | OUTPATIENT
Start: 2023-10-13

## 2023-10-13 ASSESSMENT — ENCOUNTER SYMPTOMS
CHEST TIGHTNESS: 0
BACK PAIN: 0
SINUS PRESSURE: 0
SINUS PAIN: 0
CONSTIPATION: 0
NAUSEA: 0
EYE PAIN: 0
VOMITING: 0
DIARRHEA: 0
ABDOMINAL PAIN: 0
COLOR CHANGE: 0
SHORTNESS OF BREATH: 0

## 2023-10-13 NOTE — PROGRESS NOTES
Eleonora Olea (:  1995) is a 29 y.o. male,Established patient, here for evaluation of the following chief complaint(s):  Hypertension (6 month follow up)         ASSESSMENT/PLAN:  1. Essential hypertension  -     CBC with Auto Differential; Future  2. Mixed hyperlipidemia  -     atorvastatin (LIPITOR) 20 MG tablet; TAKE 1 TABLET BY MOUTH EVERY DAY, Disp-90 tablet, R-2Normal  -     Lipid Panel; Future  3. Class 3 severe obesity with serious comorbidity and body mass index (BMI) of 40.0 to 44.9 in adult, unspecified obesity type (HCC)  -     TSH With Reflex Ft4; Future  4. Seizure disorder ([address])    HTN: Stable, continue hctz 12.5mg daily. Obesity: Continue exercise regimen. Limit high calorie/high sugar snacks. Keep walking! Seizure: Established with neuro, follow up with specialist for med management. Return in about 6 months (around 2024) for physical.         Subjective   SUBJECTIVE/OBJECTIVE:  Presents with mom for 6 month follow up of htn   PMH of ADD, ASD, and generalized seizures   Down 16lbs since last OV - going to the gym with mom a few times a week, gets in the pool   Mostly non verbal, mom is primary historian     Neuro: Completed new patient appointment with [facility] in August  No new seizure activity since last OV   Needs to update labs for neurology    Not checking BP at home - mom does have cuff if needed    Needs to complete outstanding labs  Denies any other problems/concerns at this time               Review of Systems   Constitutional:  Negative for activity change, chills, fatigue and unexpected weight change. HENT:  Negative for hearing loss, postnasal drip, sinus pressure and sinus pain. Eyes:  Negative for pain and visual disturbance. Respiratory:  Negative for chest tightness and shortness of breath. Cardiovascular:  Negative for chest pain and palpitations. Gastrointestinal:  Negative for abdominal pain, constipation, diarrhea, nausea and vomiting.

## 2023-12-01 ENCOUNTER — HOSPITAL ENCOUNTER (OUTPATIENT)
Age: 28
Discharge: HOME | End: 2023-12-01
Payer: COMMERCIAL

## 2023-12-01 DIAGNOSIS — E78.2 MIXED HYPERLIPIDEMIA: ICD-10-CM

## 2023-12-01 DIAGNOSIS — E55.9 VITAMIN D DEFICIENCY: ICD-10-CM

## 2023-12-01 DIAGNOSIS — I10 ESSENTIAL HYPERTENSION: ICD-10-CM

## 2023-12-01 DIAGNOSIS — E66.01 CLASS 3 SEVERE OBESITY WITH SERIOUS COMORBIDITY AND BODY MASS INDEX (BMI) OF 40.0 TO 44.9 IN ADULT, UNSPECIFIED OBESITY TYPE (HCC): ICD-10-CM

## 2023-12-01 LAB
25(OH)D3 SERPL-MCNC: 49.4 NG/ML
ANION GAP SERPL CALCULATED.3IONS-SCNC: 11 MMOL/L (ref 9–17)
BASOPHILS # BLD: 0.05 K/UL (ref 0–0.2)
BASOPHILS NFR BLD: 1 % (ref 0–2)
BUN SERPL-MCNC: 18 MG/DL (ref 6–20)
CALCIUM SERPL-MCNC: 9.8 MG/DL (ref 8.6–10.4)
CHLORIDE SERPL-SCNC: 101 MMOL/L (ref 98–107)
CHOLEST SERPL-MCNC: 172 MG/DL
CHOLESTEROL/HDL RATIO: 4.1
CO2 SERPL-SCNC: 28 MMOL/L (ref 20–31)
CREAT SERPL-MCNC: 1 MG/DL (ref 0.7–1.2)
EOSINOPHIL # BLD: 0.25 K/UL (ref 0–0.44)
EOSINOPHILS RELATIVE PERCENT: 3 % (ref 1–4)
ERYTHROCYTE [DISTWIDTH] IN BLOOD BY AUTOMATED COUNT: 13.2 % (ref 11.8–14.4)
GFR SERPL CREATININE-BSD FRML MDRD: >60 ML/MIN/1.73M2
GLUCOSE SERPL-MCNC: 89 MG/DL (ref 70–99)
HCT VFR BLD AUTO: 51.4 % (ref 40.7–50.3)
HDLC SERPL-MCNC: 42 MG/DL
HGB BLD-MCNC: 17.4 G/DL (ref 13–17)
IMM GRANULOCYTES # BLD AUTO: <0.03 K/UL (ref 0–0.3)
IMM GRANULOCYTES NFR BLD: 0 %
LDLC SERPL CALC-MCNC: 111 MG/DL (ref 0–130)
LEVETIRACETAM SERPL-MCNC: 7 UG/ML
LYMPHOCYTES NFR BLD: 1.95 K/UL (ref 1.1–3.7)
LYMPHOCYTES RELATIVE PERCENT: 21 % (ref 24–43)
MCH RBC QN AUTO: 29.7 PG (ref 25.2–33.5)
MCHC RBC AUTO-ENTMCNC: 33.9 G/DL (ref 28.4–34.8)
MCV RBC AUTO: 87.9 FL (ref 82.6–102.9)
MONOCYTES NFR BLD: 0.7 K/UL (ref 0.1–1.2)
MONOCYTES NFR BLD: 7 % (ref 3–12)
NEUTROPHILS NFR BLD: 68 % (ref 36–65)
NEUTS SEG NFR BLD: 6.52 K/UL (ref 1.5–8.1)
NRBC BLD-RTO: 0 PER 100 WBC
PLATELET # BLD AUTO: 277 K/UL (ref 138–453)
PMV BLD AUTO: 11 FL (ref 8.1–13.5)
POTASSIUM SERPL-SCNC: 3.9 MMOL/L (ref 3.7–5.3)
RBC # BLD AUTO: 5.85 M/UL (ref 4.21–5.77)
SODIUM SERPL-SCNC: 140 MMOL/L (ref 135–144)
TRIGL SERPL-MCNC: 94 MG/DL
TSH SERPL DL<=0.05 MIU/L-ACNC: 1.23 UIU/ML (ref 0.3–5)
WBC OTHER # BLD: 9.5 K/UL (ref 3.5–11.3)

## 2023-12-01 PROCEDURE — 84443 ASSAY THYROID STIM HORMONE: CPT

## 2023-12-01 PROCEDURE — 80048 BASIC METABOLIC PNL TOTAL CA: CPT

## 2023-12-01 PROCEDURE — 85025 COMPLETE CBC W/AUTO DIFF WBC: CPT

## 2023-12-01 PROCEDURE — 80177 DRUG SCRN QUAN LEVETIRACETAM: CPT

## 2023-12-01 PROCEDURE — 80061 LIPID PANEL: CPT

## 2023-12-01 PROCEDURE — 80183 DRUG SCRN QUANT OXCARBAZEPIN: CPT

## 2023-12-01 PROCEDURE — 36415 COLL VENOUS BLD VENIPUNCTURE: CPT

## 2023-12-01 PROCEDURE — 82306 VITAMIN D 25 HYDROXY: CPT

## 2023-12-04 DIAGNOSIS — R79.89 ABNORMAL CBC: Primary | ICD-10-CM

## 2023-12-04 LAB — 10OH-CARBAZEPINE SERPL-MCNC: 25 UG/ML (ref 3–35)

## 2023-12-07 DIAGNOSIS — E55.9 VITAMIN D DEFICIENCY: ICD-10-CM

## 2023-12-08 RX ORDER — PYRIDOXINE HCL (VITAMIN B6) 25 MG
LOZENGE ON A HANDLE MUCOUS MEMBRANE
Qty: 4 WAFER | Refills: 10 | Status: SHIPPED | OUTPATIENT
Start: 2023-12-08

## 2023-12-08 NOTE — TELEPHONE ENCOUNTER
Last visit: 10/13/2023  Last Med refill: 07/07/2023  Does patient have enough medication for 72 hours: No:     Next Visit Date:  Future Appointments   Date Time Provider [address]   4/19/2024  1:15 PM ROBERTO Lynn NP [address] Maintenance   Topic Date Due    COVID-19 Vaccine (1) 04/13/2024 (Originally 1995)    Hepatitis B vaccine (1 of 3 - 3-dose series) 10/13/2024 (Originally 1995)    Flu vaccine (1) 10/13/2024 (Originally 8/1/2023)    Depression Screen  04/13/2024    Lipids  12/01/2024    DTaP/Tdap/Td vaccine (2 - Td or Tdap) 10/27/2026    Hepatitis C screen  Completed    Hepatitis A vaccine  Aged Out    Hib vaccine  Aged Out    HPV vaccine  Aged Out    Meningococcal (ACWY) vaccine  Aged Out    Pneumococcal 0-64 years Vaccine  Aged Out    Varicella vaccine  Discontinued    HIV screen  Discontinued       Hemoglobin A1C (%)   Date Value   05/12/2022 5.2   08/26/2021 4.9   08/22/2015 5.2             ( goal A1C is < 7)   No components found for: \"LABMICR\"  LDL Cholesterol (mg/dL)   Date Value   12/01/2023 111   05/12/2022 90       (goal LDL is <100)   AST (U/L)   Date Value   05/12/2022 30     ALT (U/L)   Date Value   05/12/2022 37     BUN (mg/dL)   Date Value   12/01/2023 18     BP Readings from Last 3 Encounters:   10/13/23 130/84   04/13/23 130/82   01/12/23 126/82          (goal 120/80)    All Future Testing planned in [facility]  Lab Frequency Next Occurrence   CBC with Auto Differential Once 03/04/2024               Patient Active Problem List:     Seizure disorder [facility])     Mental developmental delay     Autistic disorder     Convulsion ([address])     Essential hypertension     Acne vulgaris     Mixed hyperlipidemia     Agitation     Epilepsy, generalized, convulsive ([address])     Morbidly obese ([address])     Cellulitis of right lower extremity     Localized edema     Trauma complication, late effect     Non-pressure chronic ulcer of right lower leg, limited to breakdown of

## 2024-01-15 DIAGNOSIS — E87.6 HYPOKALEMIA: ICD-10-CM

## 2024-01-15 RX ORDER — POTASSIUM CHLORIDE 20MEQ/15ML
LIQUID (ML) ORAL
Qty: 900 ML | Refills: 1 | Status: SHIPPED | OUTPATIENT
Start: 2024-01-15

## 2024-01-15 NOTE — TELEPHONE ENCOUNTER
Duke Loyola is calling to request a refill on the following medication(s):    Medication Request:  Requested Prescriptions     Pending Prescriptions Disp Refills    potassium chloride 20 MEQ/15ML (10%) oral solution [Pharmacy Med Name: Potassium Chloride Oral Solution 20 MEQ/15ML (10%)] 900 mL 0     Sig: TAKE 15 MLS BY MOUTH EVERY DAY       Last Visit Date (If Applicable):  10/13/2023    Next Visit Date:    4/19/2024

## 2024-01-16 RX ORDER — POTASSIUM CHLORIDE 20MEQ/15ML
LIQUID (ML) ORAL
Qty: 900 ML | Refills: 1 | OUTPATIENT
Start: 2024-01-16

## 2024-01-17 DIAGNOSIS — E78.2 MIXED HYPERLIPIDEMIA: ICD-10-CM

## 2024-01-17 NOTE — TELEPHONE ENCOUNTER
Last visit: 10/13/2023  Last Med refill: 01/10/2024  Does patient have enough medication for 72 hours: Yes    Next Visit Date:  Future Appointments   Date Time Provider Department Center   4/19/2024  1:15 PM Olga Kinsey APRN - NP [facility]       Health Maintenance   Topic Date Due    COVID-19 Vaccine (1) 04/13/2024 (Originally 1995)    Hepatitis B vaccine (1 of 3 - 3-dose series) 10/13/2024 (Originally 1995)    Flu vaccine (1) 10/13/2024 (Originally 8/1/2023)    Depression Screen  04/13/2024    Lipids  12/01/2024    DTaP/Tdap/Td vaccine (2 - Td or Tdap) 10/27/2026    Hepatitis C screen  Completed    Hepatitis A vaccine  Aged Out    Hib vaccine  Aged Out    HPV vaccine  Aged Out    Polio vaccine  Aged Out    Meningococcal (ACWY) vaccine  Aged Out    Pneumococcal 0-64 years Vaccine  Aged Out    Varicella vaccine  Discontinued    HIV screen  Discontinued       Hemoglobin A1C (%)   Date Value   05/12/2022 5.2   08/26/2021 4.9   08/22/2015 5.2             ( goal A1C is < 7)   No components found for: \"LABMICR\"  LDL Cholesterol (mg/dL)   Date Value   12/01/2023 111   05/12/2022 90       (goal LDL is <100)   AST (U/L)   Date Value   05/12/2022 30     ALT (U/L)   Date Value   05/12/2022 37     BUN (mg/dL)   Date Value   12/01/2023 18     BP Readings from Last 3 Encounters:   10/13/23 130/84   04/13/23 130/82   01/12/23 126/82          (goal 120/80)    All Future Testing planned in [facility]  Lab Frequency Next Occurrence   CBC with Auto Differential Once 03/04/2024               Patient Active Problem List:     Seizure disorder (HCC)     Mental developmental delay     Autistic disorder     Convulsion (HCC)     Essential hypertension     Acne vulgaris     Mixed hyperlipidemia     Agitation     Epilepsy, generalized, convulsive (HCC)     Morbidly obese (HCC)     Cellulitis of right lower extremity     Localized edema     Trauma complication, late effect     Non-pressure chronic ulcer of right lower leg,

## 2024-01-18 RX ORDER — ATORVASTATIN CALCIUM 20 MG/1
TABLET, FILM COATED ORAL
Qty: 30 TABLET | Refills: 10 | Status: SHIPPED | OUTPATIENT
Start: 2024-01-18

## 2024-02-14 ENCOUNTER — HOSPITAL ENCOUNTER (EMERGENCY)
Age: 29
Discharge: HOME | End: 2024-02-15
Attending: EMERGENCY MEDICINE
Payer: COMMERCIAL

## 2024-02-14 ENCOUNTER — APPOINTMENT (OUTPATIENT)
Dept: CT IMAGING | Age: 29
End: 2024-02-14
Payer: COMMERCIAL

## 2024-02-14 VITALS
SYSTOLIC BLOOD PRESSURE: 134 MMHG | HEART RATE: 73 BPM | OXYGEN SATURATION: 97 % | TEMPERATURE: 97.3 F | WEIGHT: 285.72 LBS | RESPIRATION RATE: 16 BRPM | DIASTOLIC BLOOD PRESSURE: 85 MMHG | BODY MASS INDEX: 41 KG/M2

## 2024-02-14 DIAGNOSIS — R10.31 RIGHT LOWER QUADRANT ABDOMINAL PAIN: Primary | ICD-10-CM

## 2024-02-14 LAB
ANION GAP SERPL CALCULATED.3IONS-SCNC: 10 MMOL/L (ref 9–17)
BASOPHILS # BLD: 0.04 K/UL (ref 0–0.2)
BASOPHILS NFR BLD: 1 % (ref 0–2)
BUN SERPL-MCNC: 16 MG/DL (ref 6–20)
CALCIUM SERPL-MCNC: 9 MG/DL (ref 8.6–10.4)
CHLORIDE SERPL-SCNC: 101 MMOL/L (ref 98–107)
CO2 SERPL-SCNC: 25 MMOL/L (ref 20–31)
CREAT SERPL-MCNC: 0.9 MG/DL (ref 0.7–1.2)
EOSINOPHIL # BLD: 0.24 K/UL (ref 0–0.44)
EOSINOPHILS RELATIVE PERCENT: 3 % (ref 1–4)
ERYTHROCYTE [DISTWIDTH] IN BLOOD BY AUTOMATED COUNT: 13.3 % (ref 11.8–14.4)
GFR SERPL CREATININE-BSD FRML MDRD: >60 ML/MIN/1.73M2
GLUCOSE SERPL-MCNC: 92 MG/DL (ref 70–99)
HCT VFR BLD AUTO: 47 % (ref 40.7–50.3)
HGB BLD-MCNC: 15.4 G/DL (ref 13–17)
IMM GRANULOCYTES # BLD AUTO: <0.03 K/UL (ref 0–0.3)
IMM GRANULOCYTES NFR BLD: 0 %
LIPASE SERPL-CCNC: 23 U/L (ref 13–60)
LYMPHOCYTES NFR BLD: 2.25 K/UL (ref 1.1–3.7)
LYMPHOCYTES RELATIVE PERCENT: 27 % (ref 24–43)
MCH RBC QN AUTO: 29.3 PG (ref 25.2–33.5)
MCHC RBC AUTO-ENTMCNC: 32.8 G/DL (ref 28.4–34.8)
MCV RBC AUTO: 89.5 FL (ref 82.6–102.9)
MONOCYTES NFR BLD: 0.75 K/UL (ref 0.1–1.2)
MONOCYTES NFR BLD: 9 % (ref 3–12)
NEUTROPHILS NFR BLD: 60 % (ref 36–65)
NEUTS SEG NFR BLD: 5.1 K/UL (ref 1.5–8.1)
NRBC BLD-RTO: 0 PER 100 WBC
PLATELET # BLD AUTO: 261 K/UL (ref 138–453)
PMV BLD AUTO: 10.8 FL (ref 8.1–13.5)
POTASSIUM SERPL-SCNC: 3.6 MMOL/L (ref 3.7–5.3)
RBC # BLD AUTO: 5.25 M/UL (ref 4.21–5.77)
SODIUM SERPL-SCNC: 136 MMOL/L (ref 135–144)
WBC OTHER # BLD: 8.4 K/UL (ref 3.5–11.3)

## 2024-02-14 PROCEDURE — 6360000002 HC RX W HCPCS

## 2024-02-14 PROCEDURE — 99284 EMERGENCY DEPT VISIT MOD MDM: CPT

## 2024-02-14 PROCEDURE — 83690 ASSAY OF LIPASE: CPT

## 2024-02-14 PROCEDURE — 85025 COMPLETE CBC W/AUTO DIFF WBC: CPT

## 2024-02-14 PROCEDURE — 96374 THER/PROPH/DIAG INJ IV PUSH: CPT

## 2024-02-14 PROCEDURE — 74176 CT ABD & PELVIS W/O CONTRAST: CPT

## 2024-02-14 PROCEDURE — 80048 BASIC METABOLIC PNL TOTAL CA: CPT

## 2024-02-14 PROCEDURE — 2580000003 HC RX 258

## 2024-02-14 RX ORDER — 0.9 % SODIUM CHLORIDE 0.9 %
1000 INTRAVENOUS SOLUTION INTRAVENOUS ONCE
Status: COMPLETED | OUTPATIENT
Start: 2024-02-14 | End: 2024-02-15

## 2024-02-14 RX ORDER — KETOROLAC TROMETHAMINE 30 MG/ML
30 INJECTION, SOLUTION INTRAMUSCULAR; INTRAVENOUS ONCE
Status: COMPLETED | OUTPATIENT
Start: 2024-02-14 | End: 2024-02-14

## 2024-02-14 RX ADMIN — KETOROLAC TROMETHAMINE 30 MG: 30 INJECTION, SOLUTION INTRAMUSCULAR; INTRAVENOUS at 21:55

## 2024-02-14 RX ADMIN — SODIUM CHLORIDE 1000 ML: 9 INJECTION, SOLUTION INTRAVENOUS at 21:58

## 2024-02-14 ASSESSMENT — PAIN - FUNCTIONAL ASSESSMENT: PAIN_FUNCTIONAL_ASSESSMENT: 0-10

## 2024-02-14 ASSESSMENT — PAIN DESCRIPTION - LOCATION: LOCATION: FLANK

## 2024-02-14 ASSESSMENT — PAIN DESCRIPTION - DESCRIPTORS: DESCRIPTORS: SORE

## 2024-02-14 ASSESSMENT — PAIN DESCRIPTION - ORIENTATION: ORIENTATION: LEFT

## 2024-02-15 NOTE — ED NOTES
Pt arrived to ED through triage with mother with c/o of left sided abdominal pain  Pt mother stated he has been having this pain for the last 3 hours  Pt mother stated it is hard to tell if he has been having any pain or burning with urination  Pt mother stated he has a hx of seizures and autism   Pt mother stated he is compliant with daily medications  Pt connected to bp and pulse ox  IV access started with labs drawn  Pt appears to be in no acute distress at this time

## 2024-02-15 NOTE — DISCHARGE INSTRUCTIONS
Seen in the emergency department for abdominal pain.  We did a CT scan which showed bilateral inguinal hernias.  Please follow-up with your primary care provider regarding this.    Please follow-up with your primary care provider within 1 to 2 days of discharge, please call and schedule appointment.    Avoid eating any spicy food, milk type products or drinks that have caffeine in it.  Take all medications as prescribed.  For pain use ibuprofen (Motrin) or acetaminophen (Tylenol), unless prescribed medications that have acetaminophen in it.  You can take over the counter acetaminophen tablets (1 - 2 tablets of the 500-mg strength every 6 hours) or ibuprofen tablets (2 tablets every 4 hours).    PLEASE RETURN TO THE EMERGENCY DEPARTMENT IMMEDIATELY for worsening symptoms, or if you develop any concerning symptoms such as: high fever not relieved by acetaminophen (Tylenol) and/or ibuprofen (Motrin), chills, shortness of breath, chest pain, persistent nausea and/or vomiting, numbness, weakness or tingling in the arms or legs or change in color of the extremities, changes in mental status, persistent headache, blurry vision.    Return within 8 - 12 hours if you have any of the following: worsening of pain in your abdomen, no food sounds good to you, you continue to vomit, pain goes to your back or testicles, have pain in the abdomen when going over a bump in the car or when you jump up and down, inability to urinate, unable to follow up with your physician, or other any other care or concern.

## 2024-02-15 NOTE — ED PROVIDER NOTES
[facility] ED  Emergency Department Encounter  Emergency Medicine Resident     Pt Name:Duke Loyola  MRN: [id number]  Birthdate 1995  Date of evaluation: 2/14/24  PCP:  Olga Kinsey APRN - NP  Note Started: 9:45 PM EST      CHIEF COMPLAINT       Chief Complaint   Patient presents with    Abdominal Pain       HISTORY OF PRESENT ILLNESS  (Location/Symptom, Timing/Onset, Context/Setting, Quality, Duration, Modifying Factors, Severity.)      Duke Loyola is a 28 y.o. male past medical history significant for autism, seizure disorder who presents with left-sided abdominal pain that radiates to his left flank.  Patient states the pain started earlier today.  Denies any associated nausea, vomiting, chest pain or shortness of breath.  Exam is limited due to MRDD.  Patient was not given any medications prior to arrival.    PAST MEDICAL / SURGICAL / SOCIAL / FAMILY HISTORY      has a past medical history of Autism, Mental developmental delay, Potential for violence, Seizure disorder (HCC), and Seizures (HCC).       has a past surgical history that includes Toe Surgery.      Social History     Socioeconomic History    Marital status: Single     Spouse name: Not on file    Number of children: Not on file    Years of education: Not on file    Highest education level: Not on file   Occupational History    Not on file   Tobacco Use    Smoking status: Never    Smokeless tobacco: Never   Vaping Use    Vaping Use: Never used   Substance and Sexual Activity    Alcohol use: No    Drug use: No    Sexual activity: Not on file   Other Topics Concern    Not on file   Social History Narrative    ** Merged History Encounter **          Social Determinants of Health     Financial Resource Strain: Patient Declined (4/10/2023)    Overall Financial Resource Strain (CARDIA)     Difficulty of Paying Living Expenses: Patient declined   Food Insecurity: Not on file (4/10/2023)   Transportation Needs: Unknown  basic abdominal labs, urinalysis and CT abdomen pelvis without contrast.  Patient's workup including CT scan was negative.  Patient was unable to provide urine sample and the patient and patient's mother wanted to leave prior to his results.  Patient states he felt significantly better after analgesia.  Most likely cause of patient's symptoms is musculoskeletal.  At this time we will discharge the patient strict return precautions.    Amount and/or Complexity of Data Reviewed  Labs: ordered.  Radiology: ordered.    Risk  Prescription drug management.        EKG      All EKG's are interpreted by the Emergency Department Physician who either signs or Co-signs this chart in the absence of a cardiologist.    EMERGENCY DEPARTMENT COURSE:           PROCEDURES:      CONSULTS:  None    CRITICAL CARE:  There was significant risk of life threatening deterioration of patient's condition requiring my direct management. Critical care time  minutes, excluding any documented procedures.    FINAL IMPRESSION      1. Right lower quadrant abdominal pain          DISPOSITION / PLAN     DISPOSITION Decision To Discharge 02/15/2024 12:36:20 AM      PATIENT REFERRED TO:  Olga Kinsey, APRN - NP  [address] [facility] [address]  [phone]    Schedule an appointment as soon as possible for a visit       [facility] ED  [address] [facility] [address]  [phone]  Go to   If symptoms worsen      DISCHARGE MEDICATIONS:  Discharge Medication List as of 2/15/2024 12:36 AM          Romel Scott MD  Emergency Medicine Resident    (Please note that portions of this note were completed with a voice recognition program.  Efforts were made to edit the dictations but occasionally words are mis-transcribed.)

## 2024-02-15 NOTE — ED PROVIDER NOTES
[facility]   Emergency Department  Emergency Medicine Attending Sign-out   Note started: 11:28 PM EST    Care of Duke Loyola was assumed from previous attending Dr. Dubois at 11 PM and is being seen for Abdominal Pain  .  The patient's initial evaluation and plan have been discussed with the prior provider who initially evaluated the patient.     Attestation  I was available and discussed any additional care issues that arose and coordinated the management plans with the resident(s) caring for the patient during my duty period. Any areas of disagreement with resident's documentation of care or procedures are noted on the chart. I was personally present for the key portions of any/all procedures, during my duty period. I have documented in the chart those procedures where I was not present during the key portions.     BRIEF PATIENT SUMMARY/MDM COURSE PER INITIAL PROVIDER:   RECENT VITALS:     Temp: 97.3 °F (36.3 °C),  Pulse: 73, Respirations: 16, BP: 134/85, SpO2: 97 %    This patient is a 28 y.o. Male with MRDD.  Unable to really provide full history.  Mom is present, and states that he has been holding his upper left abdomen.  But still eating with no vomiting.  Unclear if any changes in the urine.  Getting basic labs and CT abdomen pelvis without contrast to look for possible kidney stone    DIAGNOSTICS/MEDICATIONS:     MEDICATIONS GIVEN:  ED Medication Orders (From admission, onward)      Start Ordered     Status Ordering Provider    02/14/24 2200 02/14/24 2145  ketorolac (TORADOL) injection 30 mg  ONCE         Last MAR action: Given - by DUANE, SAVANNAH on 02/14/24 at 2155 LEO MCCARTHY    02/14/24 2200 02/14/24 2145  sodium chloride 0.9 % bolus 1,000 mL  ONCE         Last MAR action: New Bag - by DUANE, SAVANNAH on 02/14/24 at 2158 LEO MCCARTHY            LABS    Labs Reviewed   BASIC METABOLIC PANEL - Abnormal; Notable for the following components:       Result Value    Potassium  3.6 (*)     All other components within normal limits   CBC WITH AUTO DIFFERENTIAL   LIPASE   URINALYSIS WITH REFLEX TO CULTURE       RADIOLOGY  No results found.    OUTSTANDING TASKS / ADDITIONAL COMMENTS   Imaging and labs    Viola Lipscomb MD  Emergency Medicine Attending  [facility]        Viola Lipscomb MD  02/14/24 2399

## 2024-02-15 NOTE — ED PROVIDER NOTES
[facility]  Emergency Department  Faculty Attestation     I performed a history and physical examination of the patient and discussed management with the resident. I reviewed the resident’s note and agree with the documented findings and plan of care. Any areas of disagreement are noted on the chart. I was personally present for the key portions of any procedures. I have documented in the chart those procedures where I was not present during the key portions. I have reviewed the emergency nurses triage note. I agree with the chief complaint, past medical history, past surgical history, allergies, medications, social and family history as documented unless otherwise noted below.    For Physician Assistant/ Nurse Practitioner cases/documentation I have personally evaluated this patient and have completed at least one if not all key elements of the E/M (history, physical exam, and MDM). Additional findings are as noted.    Preliminary note started at 10:11 PM EST    Primary Care Physician:  Olga Kinsey APRN - NP    Screenings:  [unfilled]    CHIEF COMPLAINT       Chief Complaint   Patient presents with    Abdominal Pain       RECENT VITALS:   /85   Pulse 73   Temp 97.3 °F (36.3 °C) (Oral)   Resp 16   Wt 129.6 kg (285 lb 11.5 oz)   SpO2 97%   BMI 41.00 kg/m²     LABS:  Labs Reviewed   CBC WITH AUTO DIFFERENTIAL   BASIC METABOLIC PANEL   LIPASE   URINALYSIS WITH REFLEX TO CULTURE       Radiology  CT ABDOMEN PELVIS WO CONTRAST Additional Contrast? None    (Results Pending)         Attending Physician Additional  Notes    Patient has been holding his left mid abdomen, left side since yesterday.  Uncertain whether there is pain.  History is limited secondary to MRDD.  No vomiting.  No fevers.  Normal oral intake.  Uncertain whether he has urine symptoms.  On exam is nontoxic afebrile vital signs normal.  Abdomen is soft and nontender.  Skin is warm and dry.  No real

## 2024-02-19 DIAGNOSIS — I10 ESSENTIAL HYPERTENSION: ICD-10-CM

## 2024-02-21 RX ORDER — HYDROCHLOROTHIAZIDE 12.5 MG/1
TABLET ORAL
Qty: 30 TABLET | Refills: 10 | Status: SHIPPED | OUTPATIENT
Start: 2024-02-21

## 2024-04-16 SDOH — ECONOMIC STABILITY: HOUSING INSECURITY
IN THE LAST 12 MONTHS, WAS THERE A TIME WHEN YOU DID NOT HAVE A STEADY PLACE TO SLEEP OR SLEPT IN A SHELTER (INCLUDING NOW)?: NO

## 2024-04-16 SDOH — ECONOMIC STABILITY: INCOME INSECURITY: HOW HARD IS IT FOR YOU TO PAY FOR THE VERY BASICS LIKE FOOD, HOUSING, MEDICAL CARE, AND HEATING?: NOT HARD AT ALL

## 2024-04-16 SDOH — ECONOMIC STABILITY: FOOD INSECURITY: WITHIN THE PAST 12 MONTHS, THE FOOD YOU BOUGHT JUST DIDN'T LAST AND YOU DIDN'T HAVE MONEY TO GET MORE.: NEVER TRUE

## 2024-04-16 SDOH — ECONOMIC STABILITY: FOOD INSECURITY: WITHIN THE PAST 12 MONTHS, YOU WORRIED THAT YOUR FOOD WOULD RUN OUT BEFORE YOU GOT MONEY TO BUY MORE.: NEVER TRUE

## 2024-04-16 SDOH — ECONOMIC STABILITY: TRANSPORTATION INSECURITY
IN THE PAST 12 MONTHS, HAS LACK OF TRANSPORTATION KEPT YOU FROM MEETINGS, WORK, OR FROM GETTING THINGS NEEDED FOR DAILY LIVING?: NO

## 2024-04-16 ASSESSMENT — PATIENT HEALTH QUESTIONNAIRE - PHQ9
1. LITTLE INTEREST OR PLEASURE IN DOING THINGS: NOT AT ALL
SUM OF ALL RESPONSES TO PHQ QUESTIONS 1-9: 0
SUM OF ALL RESPONSES TO PHQ QUESTIONS 1-9: 0
2. FEELING DOWN, DEPRESSED OR HOPELESS: NOT AT ALL
SUM OF ALL RESPONSES TO PHQ QUESTIONS 1-9: 0
2. FEELING DOWN, DEPRESSED OR HOPELESS: NOT AT ALL
SUM OF ALL RESPONSES TO PHQ9 QUESTIONS 1 & 2: 0
SUM OF ALL RESPONSES TO PHQ QUESTIONS 1-9: 0
SUM OF ALL RESPONSES TO PHQ9 QUESTIONS 1 & 2: 0
1. LITTLE INTEREST OR PLEASURE IN DOING THINGS: NOT AT ALL

## 2024-04-19 ENCOUNTER — OFFICE VISIT (OUTPATIENT)
Dept: FAMILY MEDICINE CLINIC | Age: 29
End: 2024-04-19
Payer: COMMERCIAL

## 2024-04-19 VITALS
SYSTOLIC BLOOD PRESSURE: 132 MMHG | RESPIRATION RATE: 20 BRPM | DIASTOLIC BLOOD PRESSURE: 84 MMHG | HEART RATE: 92 BPM | BODY MASS INDEX: 41.09 KG/M2 | HEIGHT: 70 IN | OXYGEN SATURATION: 100 % | WEIGHT: 287 LBS

## 2024-04-19 DIAGNOSIS — F84.0 AUTISTIC DISORDER: ICD-10-CM

## 2024-04-19 DIAGNOSIS — I10 ESSENTIAL HYPERTENSION: Primary | ICD-10-CM

## 2024-04-19 DIAGNOSIS — H61.23 BILATERAL IMPACTED CERUMEN: ICD-10-CM

## 2024-04-19 DIAGNOSIS — G40.909 SEIZURE DISORDER (HCC): ICD-10-CM

## 2024-04-19 PROCEDURE — 3075F SYST BP GE 130 - 139MM HG: CPT | Performed by: NURSE PRACTITIONER

## 2024-04-19 PROCEDURE — 1036F TOBACCO NON-USER: CPT | Performed by: NURSE PRACTITIONER

## 2024-04-19 PROCEDURE — 3079F DIAST BP 80-89 MM HG: CPT | Performed by: NURSE PRACTITIONER

## 2024-04-19 PROCEDURE — 99214 OFFICE O/P EST MOD 30 MIN: CPT | Performed by: NURSE PRACTITIONER

## 2024-04-19 PROCEDURE — G8427 DOCREV CUR MEDS BY ELIG CLIN: HCPCS | Performed by: NURSE PRACTITIONER

## 2024-04-19 PROCEDURE — G8417 CALC BMI ABV UP PARAM F/U: HCPCS | Performed by: NURSE PRACTITIONER

## 2024-04-19 ASSESSMENT — ENCOUNTER SYMPTOMS
DIARRHEA: 0
SINUS PAIN: 0
SINUS PRESSURE: 0
EYE PAIN: 0
CONSTIPATION: 0
COLOR CHANGE: 0
NAUSEA: 0
VOMITING: 0
SHORTNESS OF BREATH: 0
CHEST TIGHTNESS: 0
BACK PAIN: 0
ABDOMINAL PAIN: 0

## 2024-04-19 NOTE — PROGRESS NOTES
change, chills, fatigue and unexpected weight change.   HENT:  Negative for hearing loss, postnasal drip, sinus pressure and sinus pain.    Eyes:  Negative for pain and visual disturbance.   Respiratory:  Negative for chest tightness and shortness of breath.    Cardiovascular:  Negative for chest pain and palpitations.   Gastrointestinal:  Negative for abdominal pain, constipation, diarrhea, nausea and vomiting.   Endocrine: Negative for polydipsia, polyphagia and polyuria.   Genitourinary:  Negative for dysuria, flank pain, frequency and urgency.   Musculoskeletal:  Negative for arthralgias, back pain and myalgias.   Skin:  Negative for color change and rash.   Neurological:  Negative for dizziness, seizures, weakness, light-headedness, numbness and headaches.   Psychiatric/Behavioral:  Negative for confusion, hallucinations, self-injury, sleep disturbance and suicidal ideas. The patient is not nervous/anxious.           Objective   Physical Exam  Vitals and nursing note reviewed.   Constitutional:       Appearance: Normal appearance. He is obese.   HENT:      Head: Normocephalic.      Right Ear: Hearing and external ear normal. No drainage or tenderness. There is impacted cerumen.      Left Ear: Hearing and external ear normal. No drainage or tenderness. There is impacted cerumen.      Ears:      Comments: Bilateral TM 25% visible during exam. Soft james wax noted in bilateral ear canals.   Does not like ears touched for long periods of time      Nose: Nose normal.      Mouth/Throat:      Mouth: Mucous membranes are moist.      Pharynx: Oropharynx is clear.   Eyes:      Extraocular Movements: Extraocular movements intact.      Conjunctiva/sclera: Conjunctivae normal.      Pupils: Pupils are equal, round, and reactive to light.   Cardiovascular:      Rate and Rhythm: Normal rate and regular rhythm.      Pulses: Normal pulses.      Heart sounds: Normal heart sounds.   Pulmonary:      Effort: Pulmonary effort is

## 2024-05-16 DIAGNOSIS — H61.23 BILATERAL IMPACTED CERUMEN: ICD-10-CM

## 2024-05-16 RX ORDER — CARBAMIDE PEROXIDE 65 MG/ML
LIQUID TOPICAL
Qty: 15 ML | Refills: 10 | Status: SHIPPED | OUTPATIENT
Start: 2024-05-16

## 2024-05-16 NOTE — TELEPHONE ENCOUNTER
Last visit: 04/19/2024  Last Med refill: 04/19/2024  Does patient have enough medication for 72 hours: No:     Next Visit Date:  Future Appointments   Date Time Provider Department Center   10/18/2024  1:15 PM Olga Kinsey APRN - NP [facility]       Health Maintenance   Topic Date Due    COVID-19 Vaccine (1) Never done    Hepatitis B vaccine (1 of 3 - 3-dose series) 10/13/2024 (Originally 1995)    Flu vaccine (Season Ended) 10/13/2024 (Originally 8/1/2024)    Lipids  12/01/2024    Depression Screen  04/16/2025    DTaP/Tdap/Td vaccine (2 - Td or Tdap) 10/27/2026    Hepatitis C screen  Completed    Hepatitis A vaccine  Aged Out    Hib vaccine  Aged Out    HPV vaccine  Aged Out    Polio vaccine  Aged Out    Meningococcal (ACWY) vaccine  Aged Out    Pneumococcal 0-64 years Vaccine  Aged Out    Varicella vaccine  Discontinued    HIV screen  Discontinued       Hemoglobin A1C (%)   Date Value   05/12/2022 5.2   08/26/2021 4.9   08/22/2015 5.2             ( goal A1C is < 7)   No components found for: \"LABMICR\"  No components found for: \"LDLCHOLESTEROL\", \"LDLCALC\"    (goal LDL is <100)   AST (U/L)   Date Value   05/12/2022 30     ALT (U/L)   Date Value   05/12/2022 37     BUN (mg/dL)   Date Value   02/14/2024 16     BP Readings from Last 3 Encounters:   04/19/24 132/84   02/14/24 134/85   10/13/23 130/84          (goal 120/80)    All Future Testing planned in [facility]  Lab Frequency Next Occurrence   CBC with Auto Differential Once 03/04/2024               Patient Active Problem List:     Seizure disorder (HCC)     Mental developmental delay     Autistic disorder     Convulsion (HCC)     Essential hypertension     Acne vulgaris     Mixed hyperlipidemia     Agitation     Epilepsy, generalized, convulsive (HCC)     Morbidly obese (HCC)     Cellulitis of right lower extremity     Localized edema     Trauma complication, late effect     Non-pressure chronic ulcer of right lower leg, limited to breakdown of skin

## 2024-06-10 DIAGNOSIS — E87.6 HYPOKALEMIA: ICD-10-CM

## 2024-06-10 RX ORDER — POTASSIUM CHLORIDE 20MEQ/15ML
LIQUID (ML) ORAL
Qty: 900 ML | Refills: 0 | Status: SHIPPED | OUTPATIENT
Start: 2024-06-10

## 2024-06-10 NOTE — TELEPHONE ENCOUNTER
Last visit: 04/19/2024  Last Med refill: 05/24/2024  Does patient have enough medication for 72 hours: Yes    Next Visit Date:  Future Appointments   Date Time Provider Department Center   10/18/2024  1:15 PM Olga Kinsey APRN - NP [id number]       Health Maintenance   Topic Date Due    COVID-19 Vaccine (1) Never done    Hepatitis B vaccine (1 of 3 - 3-dose series) 10/13/2024 (Originally 1995)    Flu vaccine (Season Ended) 10/13/2024 (Originally 8/1/2024)    Lipids  12/01/2024    Depression Screen  04/16/2025    DTaP/Tdap/Td vaccine (2 - Td or Tdap) 10/27/2026    Hepatitis C screen  Completed    Hepatitis A vaccine  Aged Out    Hib vaccine  Aged Out    HPV vaccine  Aged Out    Polio vaccine  Aged Out    Meningococcal (ACWY) vaccine  Aged Out    Pneumococcal 0-64 years Vaccine  Aged Out    Varicella vaccine  Discontinued    HIV screen  Discontinued       Hemoglobin A1C (%)   Date Value   05/12/2022 5.2   08/26/2021 4.9   08/22/2015 5.2             ( goal A1C is < 7)   No components found for: \"LABMICR\"  No components found for: \"LDLCHOLESTEROL\", \"LDLCALC\"    (goal LDL is <100)   AST (U/L)   Date Value   05/12/2022 30     ALT (U/L)   Date Value   05/12/2022 37     BUN (mg/dL)   Date Value   02/14/2024 16     BP Readings from Last 3 Encounters:   04/19/24 132/84   02/14/24 134/85   10/13/23 130/84          (goal 120/80)    All Future Testing planned in [facility]  Lab Frequency Next Occurrence   CBC with Auto Differential Once 03/04/2024               Patient Active Problem List:     Seizure disorder (HCC)     Mental developmental delay     Autistic disorder     Convulsion (HCC)     Essential hypertension     Acne vulgaris     Mixed hyperlipidemia     Agitation     Epilepsy, generalized, convulsive (HCC)     Morbidly obese (HCC)     Cellulitis of right lower extremity     Localized edema     Trauma complication, late effect     Non-pressure chronic ulcer of right lower leg, limited to breakdown of skin

## 2024-08-06 DIAGNOSIS — H61.23 BILATERAL IMPACTED CERUMEN: ICD-10-CM

## 2024-08-06 NOTE — TELEPHONE ENCOUNTER
Pharmacy uis requesting the script be sent for 30 ml (2 boxes)    Last visit: 04/19/24  Last Med refill: 07/24  Does patient have enough medication for 72 hours: No:     Next Visit Date:  Future Appointments   Date Time Provider Department Center   10/18/2024  1:15 PM Olga Kinsey APRN - NP [facility]       Health Maintenance   Topic Date Due    COVID-19 Vaccine (1) Never done    Flu vaccine (1) Never done    Hepatitis B vaccine (1 of 3 - 3-dose series) 10/13/2024 (Originally 1995)    Lipids  12/01/2024    Depression Screen  04/16/2025    DTaP/Tdap/Td vaccine (2 - Td or Tdap) 10/27/2026    Hepatitis C screen  Completed    Hepatitis A vaccine  Aged Out    Hib vaccine  Aged Out    HPV vaccine  Aged Out    Polio vaccine  Aged Out    Meningococcal (ACWY) vaccine  Aged Out    Pneumococcal 0-64 years Vaccine  Aged Out    Varicella vaccine  Discontinued    HIV screen  Discontinued       Hemoglobin A1C (%)   Date Value   05/12/2022 5.2   08/26/2021 4.9   08/22/2015 5.2             ( goal A1C is < 7)   No components found for: \"LABMICR\"  No components found for: \"LDLCHOLESTEROL\", \"LDLCALC\"    (goal LDL is <100)   AST (U/L)   Date Value   05/12/2022 30     ALT (U/L)   Date Value   05/12/2022 37     BUN (mg/dL)   Date Value   02/14/2024 16     BP Readings from Last 3 Encounters:   04/19/24 132/84   02/14/24 134/85   10/13/23 130/84          (goal 120/80)    All Future Testing planned in [facility]  Lab Frequency Next Occurrence   CBC with Auto Differential Once 03/04/2024               Patient Active Problem List:     Seizure disorder (HCC)     Mental developmental delay     Autistic disorder     Convulsion (HCC)     Essential hypertension     Acne vulgaris     Mixed hyperlipidemia     Agitation     Epilepsy, generalized, convulsive (HCC)     Morbidly obese (HCC)     Cellulitis of right lower extremity     Localized edema     Trauma complication, late effect     Non-pressure chronic ulcer of right lower

## 2024-08-07 RX ORDER — CARBAMIDE PEROXIDE 65 MG/ML
LIQUID TOPICAL
Qty: 30 ML | Refills: 10 | Status: SHIPPED | OUTPATIENT
Start: 2024-08-07

## 2024-08-21 ENCOUNTER — HOSPITAL ENCOUNTER (OUTPATIENT)
Age: 29
Discharge: HOME | End: 2024-08-21
Payer: COMMERCIAL

## 2024-08-21 LAB — LEVETIRACETAM SERPL-MCNC: 18 UG/ML

## 2024-08-21 PROCEDURE — 80177 DRUG SCRN QUAN LEVETIRACETAM: CPT

## 2024-08-21 PROCEDURE — 80183 DRUG SCRN QUANT OXCARBAZEPIN: CPT

## 2024-08-21 PROCEDURE — 36415 COLL VENOUS BLD VENIPUNCTURE: CPT

## 2024-08-24 LAB — 10OH-CARBAZEPINE SERPL-MCNC: 42 UG/ML (ref 3–35)

## 2024-09-06 DIAGNOSIS — E87.6 HYPOKALEMIA: ICD-10-CM

## 2024-09-06 RX ORDER — POTASSIUM CHLORIDE 20MEQ/15ML
20 LIQUID (ML) ORAL DAILY
Qty: 900 ML | Refills: 0 | Status: SHIPPED | OUTPATIENT
Start: 2024-09-06

## 2024-11-18 ENCOUNTER — OFFICE VISIT (OUTPATIENT)
Dept: FAMILY MEDICINE CLINIC | Age: 29
End: 2024-11-18
Payer: COMMERCIAL

## 2024-11-18 VITALS
SYSTOLIC BLOOD PRESSURE: 130 MMHG | WEIGHT: 280 LBS | DIASTOLIC BLOOD PRESSURE: 80 MMHG | OXYGEN SATURATION: 98 % | HEART RATE: 85 BPM | RESPIRATION RATE: 18 BRPM | BODY MASS INDEX: 40.09 KG/M2 | HEIGHT: 70 IN

## 2024-11-18 DIAGNOSIS — Z00.00 ENCOUNTER FOR WELL ADULT EXAM WITHOUT ABNORMAL FINDINGS: Primary | ICD-10-CM

## 2024-11-18 DIAGNOSIS — L30.9 DERMATITIS: ICD-10-CM

## 2024-11-18 DIAGNOSIS — E55.9 VITAMIN D DEFICIENCY: ICD-10-CM

## 2024-11-18 PROCEDURE — 3079F DIAST BP 80-89 MM HG: CPT | Performed by: NURSE PRACTITIONER

## 2024-11-18 PROCEDURE — 99395 PREV VISIT EST AGE 18-39: CPT | Performed by: NURSE PRACTITIONER

## 2024-11-18 PROCEDURE — G8484 FLU IMMUNIZE NO ADMIN: HCPCS | Performed by: NURSE PRACTITIONER

## 2024-11-18 PROCEDURE — 3075F SYST BP GE 130 - 139MM HG: CPT | Performed by: NURSE PRACTITIONER

## 2024-11-18 RX ORDER — PYRIDOXINE HCL (VITAMIN B6) 25 MG
50000 LOZENGE ON A HANDLE MUCOUS MEMBRANE
Qty: 4 WAFER | Refills: 0 | Status: SHIPPED | OUTPATIENT
Start: 2024-11-18

## 2024-11-18 NOTE — PROGRESS NOTES
Well Adult Note  Name: Duke Loyola Today’s Date: 2024   MRN: [id number] Sex: Male   Age: 29 y.o. Ethnicity: Non- / Non    : 1995 Race: White (non-)      Duke Loyola is here for a well adult exam.       Assessment & Plan   Encounter for well adult exam without abnormal findings  Vitamin D deficiency  -     cholecalciferol (REPLESTA) 1.25 MG (64601 UT) wafer; Take 1 Wafer by mouth every 7 days, Disp-4 Wafer, R-0Normal  -     cholecalciferol (VITAMIN D3) 1.25 MG (39373 UT) wafer; Take 1 Wafer by mouth once a week, Disp-12 Wafer, R-1Normal  Dermatitis  Refused to take watch off - advised mom to keep an eye on rash. RTC if drainage, red streaking, or if bad odor noted.         Return in 6 months (on 2025).       Subjective   History:    Presents with mom for annual exam  Has his headphones in today   Non verbal, autistic   Down 7lbs since last OV - going to [facility] with mom, swims with her while she does water aerobics     Review of Systems   Constitutional:  Negative for activity change, chills, fatigue and unexpected weight change.   HENT:  Negative for hearing loss, postnasal drip, sinus pressure and sinus pain.    Eyes:  Negative for pain and visual disturbance.   Respiratory:  Negative for chest tightness and shortness of breath.    Cardiovascular:  Negative for chest pain and palpitations.   Gastrointestinal:  Negative for abdominal pain, constipation, diarrhea, nausea and vomiting.   Endocrine: Negative for polydipsia, polyphagia and polyuria.   Genitourinary:  Negative for dysuria, flank pain, frequency and urgency.   Musculoskeletal:  Negative for arthralgias, back pain and myalgias.   Skin:  Positive for rash. Negative for color change.   Neurological:  Negative for dizziness, seizures, weakness, light-headedness, numbness and headaches.   Psychiatric/Behavioral:  Negative for confusion, hallucinations, self-injury, sleep disturbance and suicidal ideas. The patient

## 2024-11-21 ASSESSMENT — ENCOUNTER SYMPTOMS
VOMITING: 0
SINUS PAIN: 0
SINUS PRESSURE: 0
COLOR CHANGE: 0
NAUSEA: 0
SHORTNESS OF BREATH: 0
DIARRHEA: 0
CONSTIPATION: 0
EYE PAIN: 0
ABDOMINAL PAIN: 0
CHEST TIGHTNESS: 0
BACK PAIN: 0

## 2024-12-06 DIAGNOSIS — E87.6 HYPOKALEMIA: ICD-10-CM

## 2024-12-09 RX ORDER — POTASSIUM CHLORIDE 20MEQ/15ML
20 LIQUID (ML) ORAL DAILY
Qty: 900 ML | Refills: 2 | Status: SHIPPED | OUTPATIENT
Start: 2024-12-09

## 2024-12-09 NOTE — TELEPHONE ENCOUNTER
Last visit: 11/18/24  Last Med refill: 09/06/24  Does patient have enough medication for 72 hours:     Next Visit Date:  Future Appointments   Date Time Provider Department Center   5/19/2025  1:00 PM lOga Kinsey APRN - NP [facility]       Health Maintenance   Topic Date Due    Lipids  12/01/2024    Hepatitis B vaccine (1 of 3 - 19+ 3-dose series) 11/18/2025 (Originally 4/8/2014)    Flu vaccine (1) 11/18/2025 (Originally 8/1/2024)    COVID-19 Vaccine (1 - 2023-24 season) 11/18/2025 (Originally 9/1/2024)    Depression Screen  04/16/2025    DTaP/Tdap/Td vaccine (2 - Td or Tdap) 10/27/2026    Hepatitis C screen  Completed    Hepatitis A vaccine  Aged Out    Hib vaccine  Aged Out    HPV vaccine  Aged Out    Polio vaccine  Aged Out    Meningococcal (ACWY) vaccine  Aged Out    Pneumococcal 0-64 years Vaccine  Aged Out    Varicella vaccine  Discontinued    HIV screen  Discontinued       Hemoglobin A1C (%)   Date Value   05/12/2022 5.2   08/26/2021 4.9   08/22/2015 5.2             ( goal A1C is < 7)   No components found for: \"LABMICR\"  No components found for: \"LDLCHOLESTEROL\", \"LDLCALC\"    (goal LDL is <100)   AST (U/L)   Date Value   05/12/2022 30     ALT (U/L)   Date Value   05/12/2022 37     BUN (mg/dL)   Date Value   02/14/2024 16     BP Readings from Last 3 Encounters:   11/18/24 130/80   04/19/24 132/84   02/14/24 134/85          (goal 120/80)    All Future Testing planned in [facility]  Lab Frequency Next Occurrence               Patient Active Problem List:     Seizure disorder (HCC)     Mental developmental delay     Autistic disorder     Convulsion (HCC)     Essential hypertension     Acne vulgaris     Mixed hyperlipidemia     Agitation     Epilepsy, generalized, convulsive (HCC)     Morbidly obese     Cellulitis of right lower extremity     Localized edema     Trauma complication, late effect     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

## 2024-12-12 DIAGNOSIS — E78.2 MIXED HYPERLIPIDEMIA: ICD-10-CM

## 2024-12-12 RX ORDER — ATORVASTATIN CALCIUM 20 MG/1
TABLET, FILM COATED ORAL
Qty: 30 TABLET | Refills: 10 | Status: SHIPPED | OUTPATIENT
Start: 2024-12-12

## 2024-12-12 NOTE — TELEPHONE ENCOUNTER
Last visit: 11/18/2024  Last Med refill: 12/02/2024  Does patient have enough medication for 72 hours: Yes    Next Visit Date:  Future Appointments   Date Time Provider Department Center   5/19/2025  1:00 PM Olga Kinsey APRN - NP [facility]       Health Maintenance   Topic Date Due    Lipids  12/01/2024    Hepatitis B vaccine (1 of 3 - 19+ 3-dose series) 11/18/2025 (Originally 4/8/2014)    Flu vaccine (1) 11/18/2025 (Originally 8/1/2024)    COVID-19 Vaccine (1 - 2023-24 season) 11/18/2025 (Originally 9/1/2024)    Depression Screen  04/16/2025    DTaP/Tdap/Td vaccine (2 - Td or Tdap) 10/27/2026    Hepatitis C screen  Completed    Hepatitis A vaccine  Aged Out    Hib vaccine  Aged Out    HPV vaccine  Aged Out    Polio vaccine  Aged Out    Meningococcal (ACWY) vaccine  Aged Out    Pneumococcal 0-64 years Vaccine  Aged Out    Varicella vaccine  Discontinued    HIV screen  Discontinued       Hemoglobin A1C (%)   Date Value   05/12/2022 5.2   08/26/2021 4.9   08/22/2015 5.2             ( goal A1C is < 7)   No components found for: \"LABMICR\"  No components found for: \"LDLCHOLESTEROL\", \"LDLCALC\"    (goal LDL is <100)   AST (U/L)   Date Value   05/12/2022 30     ALT (U/L)   Date Value   05/12/2022 37     BUN (mg/dL)   Date Value   02/14/2024 16     BP Readings from Last 3 Encounters:   11/18/24 130/80   04/19/24 132/84   02/14/24 134/85          (goal 120/80)    All Future Testing planned in [facility]  Lab Frequency Next Occurrence               Patient Active Problem List:     Seizure disorder (HCC)     Mental developmental delay     Autistic disorder     Convulsion (HCC)     Essential hypertension     Acne vulgaris     Mixed hyperlipidemia     Agitation     Epilepsy, generalized, convulsive (HCC)     Morbidly obese     Cellulitis of right lower extremity     Localized edema     Trauma complication, late effect     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

## 2025-01-09 DIAGNOSIS — I10 ESSENTIAL HYPERTENSION: ICD-10-CM

## 2025-01-09 NOTE — TELEPHONE ENCOUNTER
Last visit: 11/18/24  Last Med refill: 2/21/24  Does patient have enough medication for 72 hours: Yes    Next Visit Date:  Future Appointments   Date Time Provider Department Center   5/19/2025  1:00 PM Olga Kinsey APRN - NP [facility]       Health Maintenance   Topic Date Due    Lipids  12/01/2024    Hepatitis B vaccine (1 of 3 - 19+ 3-dose series) 11/18/2025 (Originally 4/8/2014)    Flu vaccine (1) 11/18/2025 (Originally 8/1/2024)    COVID-19 Vaccine (1 - 2023-24 season) 11/18/2025 (Originally 9/1/2024)    Depression Screen  04/16/2025    DTaP/Tdap/Td vaccine (2 - Td or Tdap) 10/27/2026    Hepatitis C screen  Completed    Hepatitis A vaccine  Aged Out    Hib vaccine  Aged Out    HPV vaccine  Aged Out    Polio vaccine  Aged Out    Meningococcal (ACWY) vaccine  Aged Out    Pneumococcal 0-64 years Vaccine  Aged Out    Varicella vaccine  Discontinued    HIV screen  Discontinued       Hemoglobin A1C (%)   Date Value   05/12/2022 5.2   08/26/2021 4.9   08/22/2015 5.2             ( goal A1C is < 7)   No components found for: \"LABMICR\"  No components found for: \"LDLCHOLESTEROL\", \"LDLCALC\"    (goal LDL is <100)   AST (U/L)   Date Value   05/12/2022 30     ALT (U/L)   Date Value   05/12/2022 37     BUN (mg/dL)   Date Value   02/14/2024 16     BP Readings from Last 3 Encounters:   11/18/24 130/80   04/19/24 132/84   02/14/24 134/85          (goal 120/80)    All Future Testing planned in [facility]  Lab Frequency Next Occurrence               Patient Active Problem List:     Seizure disorder (HCC)     Mental developmental delay     Autistic disorder     Convulsion (HCC)     Essential hypertension     Acne vulgaris     Mixed hyperlipidemia     Agitation     Epilepsy, generalized, convulsive (HCC)     Morbidly obese     Cellulitis of right lower extremity     Localized edema     Trauma complication, late effect     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

## 2025-01-10 RX ORDER — HYDROCHLOROTHIAZIDE 12.5 MG/1
TABLET ORAL
Qty: 30 TABLET | Refills: 10 | Status: SHIPPED | OUTPATIENT
Start: 2025-01-10

## 2025-04-02 ENCOUNTER — HOSPITAL ENCOUNTER (OUTPATIENT)
Age: 30
Setting detail: SPECIMEN
Discharge: HOME | End: 2025-04-02

## 2025-04-02 LAB — LEVETIRACETAM SERPL-MCNC: 25 UG/ML

## 2025-04-05 LAB — 10OH-CARBAZEPINE SERPL-MCNC: 41.6 UG/ML (ref 10–35)

## 2025-05-18 SDOH — ECONOMIC STABILITY: FOOD INSECURITY: WITHIN THE PAST 12 MONTHS, YOU WORRIED THAT YOUR FOOD WOULD RUN OUT BEFORE YOU GOT MONEY TO BUY MORE.: NEVER TRUE

## 2025-05-18 SDOH — ECONOMIC STABILITY: INCOME INSECURITY: IN THE LAST 12 MONTHS, WAS THERE A TIME WHEN YOU WERE NOT ABLE TO PAY THE MORTGAGE OR RENT ON TIME?: NO

## 2025-05-18 SDOH — ECONOMIC STABILITY: FOOD INSECURITY: WITHIN THE PAST 12 MONTHS, THE FOOD YOU BOUGHT JUST DIDN'T LAST AND YOU DIDN'T HAVE MONEY TO GET MORE.: NEVER TRUE

## 2025-05-18 SDOH — ECONOMIC STABILITY: TRANSPORTATION INSECURITY
IN THE PAST 12 MONTHS, HAS THE LACK OF TRANSPORTATION KEPT YOU FROM MEDICAL APPOINTMENTS OR FROM GETTING MEDICATIONS?: NO

## 2025-05-18 ASSESSMENT — PATIENT HEALTH QUESTIONNAIRE - PHQ9
SUM OF ALL RESPONSES TO PHQ QUESTIONS 1-9: 0
1. LITTLE INTEREST OR PLEASURE IN DOING THINGS: NOT AT ALL
2. FEELING DOWN, DEPRESSED OR HOPELESS: NOT AT ALL
SUM OF ALL RESPONSES TO PHQ QUESTIONS 1-9: 0

## 2025-05-19 ASSESSMENT — PATIENT HEALTH QUESTIONNAIRE - PHQ9
SUM OF ALL RESPONSES TO PHQ9 QUESTIONS 1 & 2: 0
1. LITTLE INTEREST OR PLEASURE IN DOING THINGS: NOT AT ALL
2. FEELING DOWN, DEPRESSED OR HOPELESS: NOT AT ALL

## 2025-05-22 DIAGNOSIS — E55.9 VITAMIN D DEFICIENCY: ICD-10-CM

## 2025-05-22 NOTE — TELEPHONE ENCOUNTER
Last visit: 11/18/2024  Last Med refill: 11/18/2024  Does patient have enough medication for 72 hours: No:     Pharmacy called for refill     Next Visit Date:  Future Appointments   Date Time Provider Department Center   5/29/2025 11:30 AM Olga Kinsey APRN - NP [facility]       Health Maintenance   Topic Date Due    Lipids  12/01/2024    Hepatitis B vaccine (1 of 3 - 19+ 3-dose series) 11/18/2025 (Originally 4/8/2014)    Flu vaccine (Season Ended) 11/18/2025 (Originally 8/1/2025)    COVID-19 Vaccine (1 - 2024-25 season) 11/18/2025 (Originally 9/1/2024)    Depression Screen  05/18/2026    DTaP/Tdap/Td vaccine (2 - Td or Tdap) 10/27/2026    Hepatitis C screen  Completed    Hepatitis A vaccine  Aged Out    Hib vaccine  Aged Out    HPV vaccine  Aged Out    Polio vaccine  Aged Out    Meningococcal (ACWY) vaccine  Aged Out    Meningococcal B vaccine  Aged Out    Pneumococcal 0-49 years Vaccine  Aged Out    Varicella vaccine  Discontinued    HIV screen  Discontinued       Hemoglobin A1C (%)   Date Value   05/12/2022 5.2   08/26/2021 4.9   08/22/2015 5.2             ( goal A1C is < 7)   No components found for: \"LABMICR\"  No components found for: \"LDLCHOLESTEROL\", \"LDLCALC\"    (goal LDL is <100)   AST (U/L)   Date Value   05/12/2022 30     ALT (U/L)   Date Value   05/12/2022 37     BUN (mg/dL)   Date Value   02/14/2024 16     BP Readings from Last 3 Encounters:   11/18/24 130/80   04/19/24 132/84   02/14/24 134/85          (goal 120/80)    All Future Testing planned in [facility]  Lab Frequency Next Occurrence               Patient Active Problem List:     Seizure disorder (HCC)     Mental developmental delay     Autistic disorder     Convulsion (HCC)     Essential hypertension     Acne vulgaris     Mixed hyperlipidemia     Agitation     Epilepsy, generalized, convulsive (HCC)     Morbidly obese (HCC)     Cellulitis of right lower extremity     Localized edema     Trauma complication, late effect

## 2025-05-23 RX ORDER — PYRIDOXINE HCL (VITAMIN B6) 25 MG
50000 LOZENGE ON A HANDLE MUCOUS MEMBRANE
Qty: 4 WAFER | Refills: 5 | Status: SHIPPED | OUTPATIENT
Start: 2025-05-23

## 2025-05-29 ENCOUNTER — OFFICE VISIT (OUTPATIENT)
Dept: FAMILY MEDICINE CLINIC | Age: 30
End: 2025-05-29
Payer: COMMERCIAL

## 2025-05-29 ENCOUNTER — HOSPITAL ENCOUNTER (OUTPATIENT)
Age: 30
Setting detail: SPECIMEN
Discharge: HOME | End: 2025-05-29

## 2025-05-29 VITALS
HEIGHT: 70 IN | HEART RATE: 97 BPM | WEIGHT: 284 LBS | OXYGEN SATURATION: 98 % | BODY MASS INDEX: 40.66 KG/M2 | SYSTOLIC BLOOD PRESSURE: 128 MMHG | DIASTOLIC BLOOD PRESSURE: 80 MMHG | RESPIRATION RATE: 20 BRPM

## 2025-05-29 DIAGNOSIS — E78.2 MIXED HYPERLIPIDEMIA: ICD-10-CM

## 2025-05-29 DIAGNOSIS — E55.9 VITAMIN D DEFICIENCY: ICD-10-CM

## 2025-05-29 DIAGNOSIS — I10 ESSENTIAL HYPERTENSION: ICD-10-CM

## 2025-05-29 DIAGNOSIS — R73.09 ELEVATED GLUCOSE: ICD-10-CM

## 2025-05-29 DIAGNOSIS — I10 ESSENTIAL HYPERTENSION: Primary | ICD-10-CM

## 2025-05-29 DIAGNOSIS — E66.813 CLASS 3 SEVERE OBESITY WITH SERIOUS COMORBIDITY AND BODY MASS INDEX (BMI) OF 40.0 TO 44.9 IN ADULT, UNSPECIFIED OBESITY TYPE (HCC): ICD-10-CM

## 2025-05-29 DIAGNOSIS — H61.22 LEFT EAR IMPACTED CERUMEN: ICD-10-CM

## 2025-05-29 DIAGNOSIS — G40.909 SEIZURE DISORDER (HCC): ICD-10-CM

## 2025-05-29 LAB
25(OH)D3 SERPL-MCNC: 39.9 NG/ML (ref 30–100)
ALBUMIN SERPL-MCNC: 4.5 G/DL (ref 3.5–5.2)
ALBUMIN/GLOB SERPL: 1.6 {RATIO} (ref 1–2.5)
ALP SERPL-CCNC: 76 U/L (ref 40–129)
ALT SERPL-CCNC: 49 U/L (ref 10–50)
ANION GAP SERPL CALCULATED.3IONS-SCNC: 14 MMOL/L (ref 9–16)
AST SERPL-CCNC: 38 U/L (ref 10–50)
BASOPHILS # BLD: 0.05 K/UL (ref 0–0.2)
BASOPHILS NFR BLD: 1 % (ref 0–2)
BILIRUB SERPL-MCNC: 0.3 MG/DL (ref 0–1.2)
BUN SERPL-MCNC: 16 MG/DL (ref 6–20)
CALCIUM SERPL-MCNC: 9.9 MG/DL (ref 8.6–10.4)
CHLORIDE SERPL-SCNC: 98 MMOL/L (ref 98–107)
CHOLEST SERPL-MCNC: 178 MG/DL (ref 0–199)
CHOLESTEROL/HDL RATIO: 4.7
CO2 SERPL-SCNC: 26 MMOL/L (ref 20–31)
CREAT SERPL-MCNC: 1.1 MG/DL (ref 0.7–1.2)
EOSINOPHIL # BLD: 0.4 K/UL (ref 0–0.44)
EOSINOPHILS RELATIVE PERCENT: 4 % (ref 1–4)
ERYTHROCYTE [DISTWIDTH] IN BLOOD BY AUTOMATED COUNT: 13.3 % (ref 11.8–14.4)
EST. AVERAGE GLUCOSE BLD GHB EST-MCNC: 97 MG/DL
GFR, ESTIMATED: >90 ML/MIN/1.73M2
GLUCOSE SERPL-MCNC: 80 MG/DL (ref 74–99)
HBA1C MFR BLD: 5 % (ref 4–6)
HCT VFR BLD AUTO: 43.6 % (ref 40.7–50.3)
HDLC SERPL-MCNC: 38 MG/DL
HGB BLD-MCNC: 14.7 G/DL (ref 13–17)
IMM GRANULOCYTES # BLD AUTO: <0.03 K/UL (ref 0–0.3)
IMM GRANULOCYTES NFR BLD: 0 %
LDLC SERPL CALC-MCNC: 107 MG/DL (ref 0–100)
LYMPHOCYTES NFR BLD: 2.92 K/UL (ref 1.1–3.7)
LYMPHOCYTES RELATIVE PERCENT: 32 % (ref 24–43)
MCH RBC QN AUTO: 29.5 PG (ref 25.2–33.5)
MCHC RBC AUTO-ENTMCNC: 33.7 G/DL (ref 28.4–34.8)
MCV RBC AUTO: 87.4 FL (ref 82.6–102.9)
MONOCYTES NFR BLD: 0.83 K/UL (ref 0.1–1.2)
MONOCYTES NFR BLD: 9 % (ref 3–12)
NEUTROPHILS NFR BLD: 54 % (ref 36–65)
NEUTS SEG NFR BLD: 4.86 K/UL (ref 1.5–8.1)
NRBC BLD-RTO: 0 PER 100 WBC
PLATELET # BLD AUTO: 253 K/UL (ref 138–453)
PMV BLD AUTO: 11.2 FL (ref 8.1–13.5)
POTASSIUM SERPL-SCNC: 3.2 MMOL/L (ref 3.7–5.3)
PROT SERPL-MCNC: 7.4 G/DL (ref 6.6–8.7)
RBC # BLD AUTO: 4.99 M/UL (ref 4.21–5.77)
SODIUM SERPL-SCNC: 138 MMOL/L (ref 136–145)
TRIGL SERPL-MCNC: 165 MG/DL
TSH SERPL DL<=0.05 MIU/L-ACNC: 3.47 UIU/ML (ref 0.27–4.2)
VLDLC SERPL CALC-MCNC: 33 MG/DL (ref 1–30)
WBC OTHER # BLD: 9.1 K/UL (ref 3.5–11.3)

## 2025-05-29 PROCEDURE — G8417 CALC BMI ABV UP PARAM F/U: HCPCS | Performed by: NURSE PRACTITIONER

## 2025-05-29 PROCEDURE — 1036F TOBACCO NON-USER: CPT | Performed by: NURSE PRACTITIONER

## 2025-05-29 PROCEDURE — 3074F SYST BP LT 130 MM HG: CPT | Performed by: NURSE PRACTITIONER

## 2025-05-29 PROCEDURE — G8427 DOCREV CUR MEDS BY ELIG CLIN: HCPCS | Performed by: NURSE PRACTITIONER

## 2025-05-29 PROCEDURE — 3079F DIAST BP 80-89 MM HG: CPT | Performed by: NURSE PRACTITIONER

## 2025-05-29 PROCEDURE — 99214 OFFICE O/P EST MOD 30 MIN: CPT | Performed by: NURSE PRACTITIONER

## 2025-05-29 SDOH — ECONOMIC STABILITY: FOOD INSECURITY: WITHIN THE PAST 12 MONTHS, YOU WORRIED THAT YOUR FOOD WOULD RUN OUT BEFORE YOU GOT MONEY TO BUY MORE.: NEVER TRUE

## 2025-05-29 SDOH — ECONOMIC STABILITY: FOOD INSECURITY: WITHIN THE PAST 12 MONTHS, THE FOOD YOU BOUGHT JUST DIDN'T LAST AND YOU DIDN'T HAVE MONEY TO GET MORE.: NEVER TRUE

## 2025-05-29 SDOH — ECONOMIC STABILITY: INCOME INSECURITY: IN THE LAST 12 MONTHS, WAS THERE A TIME WHEN YOU WERE NOT ABLE TO PAY THE MORTGAGE OR RENT ON TIME?: NO

## 2025-05-29 ASSESSMENT — ENCOUNTER SYMPTOMS
ABDOMINAL PAIN: 0
SHORTNESS OF BREATH: 0
SINUS PAIN: 0
VOMITING: 0
BACK PAIN: 0
COLOR CHANGE: 0
DIARRHEA: 0
CHEST TIGHTNESS: 0
CONSTIPATION: 0
SINUS PRESSURE: 0
EYE PAIN: 0
NAUSEA: 0

## 2025-05-29 NOTE — PROGRESS NOTES
Duke Loyola (:  1995) is a 30 y.o. male,Established patient, here for evaluation of the following chief complaint(s):  Hypertension         Assessment & Plan  Essential hypertension   Chronic, at goal (stable), continue hydrochlorothiazide 12.5mg daily. Continue working on weight loss.     Orders:    CBC with Auto Differential; Future    Comprehensive Metabolic Panel; Future    Seizure disorder (HCC)   Chronic condition, managed by neurology (Dr. Lyon), last OV 2025 - reviewed in care everywhere.         Mixed hyperlipidemia     Orders:    Lipid Panel; Future    Class 3 severe obesity with serious comorbidity and body mass index (BMI) of 40.0 to 44.9 in adult, unspecified obesity type (HCC)     Orders:    TSH reflex to FT4; Future    Elevated glucose     Orders:    Hemoglobin A1C; Future    Vitamin D deficiency     Orders:    Vitamin D 25 Hydroxy; Future    Left ear impacted cerumen    Mom declines ear irrigation today, has surplus of debrox drops at home she will start using on him. If unable to clear she was advised to schedule irrigation appointment.            Return in about 6 months (around 2025) for physical.       Subjective   Presents for follow up of chronic conditions with mom  Got new cell phone  Autistic, non verbal     HTN: Chronic condition  Continues to work on weight loss, walking - stopped wearing his watch to track steps   Does water aerobics with mom   Does not check BP at home  Unable to verbalize if he has chest pain, heart palpitations. Mom has not noticed any out of the ordinary behaviors.     Willing to update safety labs         Review of Systems   Constitutional:  Negative for activity change, chills, fatigue and unexpected weight change.   HENT:  Negative for hearing loss, postnasal drip, sinus pressure and sinus pain.    Eyes:  Negative for pain and visual disturbance.   Respiratory:  Negative for chest tightness and shortness of breath.    Cardiovascular:

## 2025-05-29 NOTE — ASSESSMENT & PLAN NOTE
Chronic, at goal (stable), continue hydrochlorothiazide 12.5mg daily. Continue working on weight loss.     Orders:    CBC with Auto Differential; Future    Comprehensive Metabolic Panel; Future

## 2025-05-29 NOTE — ASSESSMENT & PLAN NOTE
Chronic condition, managed by neurology (Dr. Lyon), last OV 04/02/2025 - reviewed in care everywhere.

## 2025-06-05 ENCOUNTER — RESULTS FOLLOW-UP (OUTPATIENT)
Dept: FAMILY MEDICINE CLINIC | Age: 30
End: 2025-06-05

## 2025-06-05 DIAGNOSIS — E87.6 HYPOKALEMIA: ICD-10-CM

## 2025-06-09 RX ORDER — POTASSIUM CHLORIDE 20MEQ/15ML
20 LIQUID (ML) ORAL 2 TIMES DAILY
Qty: 900 ML | Refills: 2 | Status: SHIPPED | OUTPATIENT
Start: 2025-06-09